# Patient Record
Sex: FEMALE | Race: WHITE | NOT HISPANIC OR LATINO | Employment: FULL TIME | URBAN - METROPOLITAN AREA
[De-identification: names, ages, dates, MRNs, and addresses within clinical notes are randomized per-mention and may not be internally consistent; named-entity substitution may affect disease eponyms.]

---

## 2017-01-13 ENCOUNTER — OFFICE VISIT (OUTPATIENT)
Dept: URGENT CARE | Facility: MEDICAL CENTER | Age: 62
End: 2017-01-13
Payer: COMMERCIAL

## 2017-01-13 ENCOUNTER — TRANSCRIBE ORDERS (OUTPATIENT)
Dept: URGENT CARE | Facility: MEDICAL CENTER | Age: 62
End: 2017-01-13

## 2017-01-13 ENCOUNTER — HOSPITAL ENCOUNTER (OUTPATIENT)
Dept: RADIOLOGY | Facility: MEDICAL CENTER | Age: 62
Discharge: HOME | End: 2017-01-13
Admitting: FAMILY MEDICINE
Payer: COMMERCIAL

## 2017-01-13 DIAGNOSIS — R05.9 COUGH: ICD-10-CM

## 2017-01-13 PROCEDURE — 99203 OFFICE O/P NEW LOW 30 MIN: CPT

## 2017-01-13 PROCEDURE — 71020 HB CHEST X-RAY 2VW FRONTAL&LATL: CPT

## 2017-02-06 ENCOUNTER — ALLSCRIPTS OFFICE VISIT (OUTPATIENT)
Dept: OTHER | Facility: OTHER | Age: 62
End: 2017-02-06

## 2017-03-23 ENCOUNTER — HOSPITAL ENCOUNTER (OUTPATIENT)
Dept: NON INVASIVE DIAGNOSTICS | Facility: CLINIC | Age: 62
Discharge: HOME | End: 2017-03-23
Payer: COMMERCIAL

## 2017-03-23 DIAGNOSIS — I65.23 CAROTID ARTERY STENOSIS, ASYMPTOMATIC, BILATERAL: ICD-10-CM

## 2017-03-23 DIAGNOSIS — I65.29 OCCLUSION AND STENOSIS OF CAROTID ARTERY WITHOUT MENTION OF CEREBRAL INFARCTION: ICD-10-CM

## 2017-03-23 PROCEDURE — 93880 EXTRACRANIAL BILAT STUDY: CPT

## 2017-04-03 DIAGNOSIS — I65.29 OCCLUSION AND STENOSIS OF UNSPECIFIED CAROTID ARTERY: ICD-10-CM

## 2017-04-03 DIAGNOSIS — R12 HEARTBURN: ICD-10-CM

## 2017-04-03 DIAGNOSIS — R00.2 PALPITATIONS: ICD-10-CM

## 2017-04-05 ENCOUNTER — ALLSCRIPTS OFFICE VISIT (OUTPATIENT)
Dept: OTHER | Facility: OTHER | Age: 62
End: 2017-04-05

## 2017-04-17 ENCOUNTER — HOSPITAL ENCOUNTER (OUTPATIENT)
Dept: NON INVASIVE DIAGNOSTICS | Facility: CLINIC | Age: 62
Discharge: HOME | End: 2017-04-17
Payer: COMMERCIAL

## 2017-04-17 DIAGNOSIS — R00.2 PALPITATIONS: ICD-10-CM

## 2017-04-17 DIAGNOSIS — R12 HEARTBURN: ICD-10-CM

## 2017-04-17 PROCEDURE — 93225 XTRNL ECG REC<48 HRS REC: CPT

## 2017-04-17 PROCEDURE — 93226 XTRNL ECG REC<48 HR SCAN A/R: CPT

## 2017-04-20 ENCOUNTER — TRANSCRIBE ORDERS (OUTPATIENT)
Dept: ADMINISTRATIVE | Facility: HOSPITAL | Age: 62
End: 2017-04-20

## 2017-04-20 ENCOUNTER — ALLSCRIPTS OFFICE VISIT (OUTPATIENT)
Dept: OTHER | Facility: OTHER | Age: 62
End: 2017-04-20

## 2017-04-20 DIAGNOSIS — I65.29 STENOSIS OF CAROTID ARTERY, UNSPECIFIED LATERALITY: Primary | ICD-10-CM

## 2017-04-24 ENCOUNTER — APPOINTMENT (OUTPATIENT)
Dept: LAB | Facility: HOSPITAL | Age: 62
End: 2017-04-24
Attending: SURGERY
Payer: COMMERCIAL

## 2017-04-24 DIAGNOSIS — I65.29 OCCLUSION AND STENOSIS OF UNSPECIFIED CAROTID ARTERY: ICD-10-CM

## 2017-04-24 LAB
ANION GAP SERPL CALCULATED.3IONS-SCNC: 10 MMOL/L (ref 4–13)
BUN SERPL-MCNC: 23 MG/DL (ref 5–25)
CALCIUM SERPL-MCNC: 9.1 MG/DL (ref 8.3–10.1)
CHLORIDE SERPL-SCNC: 104 MMOL/L (ref 100–108)
CO2 SERPL-SCNC: 25 MMOL/L (ref 21–32)
CREAT SERPL-MCNC: 0.83 MG/DL (ref 0.6–1.3)
GFR SERPL CREATININE-BSD FRML MDRD: >60 ML/MIN/1.73SQ M
GLUCOSE P FAST SERPL-MCNC: 178 MG/DL (ref 65–99)
POTASSIUM SERPL-SCNC: 4.5 MMOL/L (ref 3.5–5.3)
SODIUM SERPL-SCNC: 139 MMOL/L (ref 136–145)

## 2017-04-24 PROCEDURE — 80048 BASIC METABOLIC PNL TOTAL CA: CPT

## 2017-04-24 PROCEDURE — 36415 COLL VENOUS BLD VENIPUNCTURE: CPT

## 2017-05-01 ENCOUNTER — HOSPITAL ENCOUNTER (OUTPATIENT)
Dept: CT IMAGING | Facility: HOSPITAL | Age: 62
Discharge: HOME | End: 2017-05-01
Attending: SURGERY
Payer: COMMERCIAL

## 2017-05-01 DIAGNOSIS — I65.29 STENOSIS OF CAROTID ARTERY, UNSPECIFIED LATERALITY: ICD-10-CM

## 2017-05-01 PROCEDURE — 70498 CT ANGIOGRAPHY NECK: CPT

## 2017-05-01 RX ADMIN — IOHEXOL 85 ML: 350 INJECTION, SOLUTION INTRAVENOUS at 15:47

## 2017-05-18 ENCOUNTER — ALLSCRIPTS OFFICE VISIT (OUTPATIENT)
Dept: OTHER | Facility: OTHER | Age: 62
End: 2017-05-18

## 2017-08-18 ENCOUNTER — GENERIC CONVERSION - ENCOUNTER (OUTPATIENT)
Dept: OTHER | Facility: OTHER | Age: 62
End: 2017-08-18

## 2017-08-28 ENCOUNTER — ALLSCRIPTS OFFICE VISIT (OUTPATIENT)
Dept: OTHER | Facility: OTHER | Age: 62
End: 2017-08-28

## 2017-09-28 ENCOUNTER — HOSPITAL ENCOUNTER (OUTPATIENT)
Dept: NON INVASIVE DIAGNOSTICS | Facility: CLINIC | Age: 62
Discharge: HOME | End: 2017-09-28
Payer: COMMERCIAL

## 2017-09-28 DIAGNOSIS — I65.29 OCCLUSION AND STENOSIS OF CAROTID ARTERY WITHOUT MENTION OF CEREBRAL INFARCTION: ICD-10-CM

## 2017-09-28 DIAGNOSIS — I65.23 CAROTID ARTERY STENOSIS, ASYMPTOMATIC, BILATERAL: ICD-10-CM

## 2017-09-28 PROCEDURE — 93880 EXTRACRANIAL BILAT STUDY: CPT

## 2017-10-24 NOTE — PROGRESS NOTES
Assessment  1  Sepsis secondary to UTI (038 9,995 91,599 0) (A41 9,N39 0)  2  Type 1 diabetes mellitus without complication (774 85) (H54 5)   · Ophtho in [facility]  Does not have podiatry  3  Insulin long-term use (V58 67) (Z79 4)  4  Hypertension (401 9) (I10)  5  Hyperlipidemia (272 4) (E78 5)  6  3-vessel CAD (414 00) (I25 10)   · Dr Raygoza Hence  7  Asymptomatic carotid artery stenosis (433 10) (I65 29)    Discussion/Summary  Surgical Clearance: She is at a LOW TO MODERATE risk from a cardiovascular standpoint at this time without any additional cardiac testing  Reevaluation needed, if she should present with symptoms prior to surgery/procedure  #1: Sepsis: Patient did have urosepsis secondary to likely stenosis  She has a small stent, and will be seeing urology to have this changed  Based on all of her risk factors, she does have some moderate risk, but overall it should be reasonable for her to proceed with the planned cystoscopy and stent exchange if necessary  Diabetes type 1: Patient is a type I diabetic  Her care for diabetes is done with Dr Anjel Champion  Please consult Dr Anjel Champion if necessary  She does report that her last A1c was 9, however given the urgent need for changing the stent she should proceed with the planned exchange  Long-term use of insulin: Patient is currently using an insulin pump for her type 1 diabetes  Followed by Dr Anjel Champion  Hypertension: Repeated blood pressure was 140/70  No changes at the moment  Follow with cardiology in this office in the future  Hyperlipidemia: Currently on simvastatin at 40 mg  No changes  Follow with office visit in the future  I would recommend every 6 months checking for LFTs  CAD: Stable at the moment  Follows with cardiology  No changes  Carotid stenosis: Patient following with vascular  No changes at the moment  note, the cardiologist had clear the patient and said it was reasonable for her to discontinue Plavix and aspirin one week prior to surgery  will request records from Dr Sumanth Ibarra for recent labs and diabetes testing  Chief Complaint  Preop Physical scheduled with [facility] 9/6  Cystoscopy with possible stent removal and dilation  cleared by Cardio  bw done this past Friday  History of Present Illness  Pre-Op Visit: The patient is being seen for a preoperative visit  The procedure is a(n) Cystoscopy scheduled for 6 September 2017 with Dr Robin Cantu  The indication for surgery is Cloudy urine, and urosepsis  Surgical Risk Assessment:   Prior Anesthesia: She had prior anesthesia,-- no prior adverse reaction to edidural anesthesia-- and-- a prior adverse reaction to general anesthesia-- (Nausea)  Pertinent Past Medical History: no angina, no arrythmia, CAD, CAD with prior MI, CAD without recent PCI, no CHF, no chronic liver disease, no acute hepatitis, no coagulation delay, no primary hypercoagulable state, no secondary hypercoagulable state, no pulmonary embolism, no DVT, uses anticoagulants, diabetes (Patient is true type I diabetic on insulin pump), insulin use, no thyroid disease, no neck osteoarthrosis, no TMJ osteoarthrosis, does not wear dentures, no seizure disorder, no CVA, no asthma, no COPD, not RUTHY, no renal disease, no low serum albumin and no obesity  Exercise Capacity: able to walk four blocks without symptoms-- and-- able to walk two flights of stairs without symptoms  Lifestyle Factors: denies tobacco use, denies heavy alcohol consumption and denies illegal drug use  Symptoms: no symptoms  STOP questionnaire score is 2  Additional RUTHY risk factors include age over 48, but-- normal BMI,-- female gender-- and-- normal neck circumference  Pertinent Family History: no pertinent family history  Living Situation: home is secure and supportive, no post-op concerns with her living situation, living independently with family, private residence and one floor layout  HPI: Patient had been at a urgent care for UTI   She was treated with antibiotics, but did not improve significantly  She ended up with a kidney infection  After that, she was septic she was away from home at the time and wound up in the hospital in [place]  She was admitted for several days, and was DKA had nausea, vomiting  She is seen by Dr Bouchra Tapia for type 1 diabetes on a pump her discharge from the hospital, she has been to urology  They're planning to do a cystoscopy and ureteroscopy, with possible stent exchange  Review of Systems    Constitutional: No fever, no chills, feels well, no tiredness, no recent weight gain or weight loss  Eyes: No complaints of eye pain, no red eyes, no eyesight problems, no discharge, no dry eyes, no itching of eyes  ENT: no complaints of earache, no loss of hearing, no nose bleeds, no nasal discharge, no sore throat, no hoarseness  Cardiovascular: No complaints of slow heart rate, no fast heart rate, no chest pain, no palpitations, no leg claudication, no lower extremity edema  Respiratory: No complaints of shortness of breath, no wheezing, no cough, no SOB on exertion, no orthopnea, no PND  Gastrointestinal: No complaints of abdominal pain, no constipation, no nausea or vomiting, no diarrhea, no bloody stools  Genitourinary: as noted in HPI  Musculoskeletal: No complaints of arthralgias, no myalgias, no joint swelling or stiffness, no limb pain or swelling  Integumentary: No complaints of skin rash or lesions, no itching, no skin wounds, no breast pain or lump  Neurological: No complaints of headache, no confusion, no convulsions, no numbness, no dizziness or fainting, no tingling, no limb weakness, no difficulty walking  Psychiatric: Not suicidal, no sleep disturbance, no anxiety or depression, no change in personality, no emotional problems  Endocrine: No complaints of proptosis, no hot flashes, no muscle weakness, no deepening of the voice, no feelings of weakness     Hematologic/Lymphatic: No complaints of swollen glands, no swollen glands in the neck, does not bleed easily, does not bruise easily  Active Problems  1  3-vessel CAD (414 00) (I25 10)  2  History of Acute Myocardial Infarction (V12 59)  3  Asymptomatic carotid artery stenosis (433 10) (I65 29)  4  Colon cancer screening (V76 51) (Z12 11)  5  Early menopause (256 31) (E28 319)  6  Edema (782 3) (R60 9)  7  Encounter for screening for osteoporosis (V82 81) (Z13 820)  8  Encounter for screening mammogram for breast cancer (V76 12) (Z12 31)  9  Heart burn (787 1) (R12)  10  Hyperlipidemia (272 4) (E78 5)  11  Hypertension (401 9) (I10)  12  Insulin long-term use (V58 67) (Z79 4)  13  Lung nodule seen on imaging study (793 11) (R91 1)  14  Old myocardial infarction (412) (I25 2)  15  Palpitations (785 1) (R00 2)  16  Right wrist pain (719 43) (M25 531)  17  Stenosis of right carotid artery (433 10) (I65 21)  18  Tendonitis of wrist, right (727 05) (M77 8)  19  Type 1 diabetes mellitus without complication (154 26) (B14 6)  20  Visit for routine gyn exam ( 31) (Z01 419)    Past Medical History   · Acute bronchitis (466 0) (J20 9)   · History of Acute Myocardial Infarction (V12 59)   · History of ectopic pregnancy (V13 29) (Z87 59)    The active problems and past medical history were reviewed and updated today  Surgical History   · History of Cath Stent Placement   · History of  Section   · History of Closed Treatment Of Fracture Of Metatarsal Bone(S) (Each)   · History of Tubal Ligation    The surgical history was reviewed and updated today  Family History  Father    · Family history of Myocardial Infarction Arrhythmias   · Family history of Prostate Cancer (V16 42)  Family History    · Family history of Coronary Artery Triple Arterial Bypass Graft    The family history was reviewed and updated today         Social History   · Being A Social Drinker   · Denied: History of Drug Use   · Former smoker (S48 97) (X04 088)  The social history was reviewed and updated today  The social history was reviewed and is unchanged  Current Meds  1  Aspirin 81 MG TABS; TAKE 1 TABLET DAILY; Therapy: (Recorded:52Cql6358) to Recorded  2  Clopidogrel Bisulfate 75 MG Oral Tablet; Take 1 tablet daily; Therapy: 73LQO3977 to (Last Rx:42Cmv0312)  Requested for: 07Moj1271 Ordered  3  FreeStyle Lite Test In [employer]; Check 4 times daily; Therapy: 00Kkm6686 to (Evaluate:32Vor9254)  Requested for: 85JIA2397; Last   Rx:68Xbv1473; Status: ACTIVE - Renewal Denied Ordered  4  HumaLOG 100 UNIT/ML Subcutaneous Solution; INJECT 180 UNITS EVERY 3 DAYS   VIA PUMP; Therapy: 89YRW1206 to (Evaluate:48Sfh5782)  Requested for: 65Tnd9053; Last   Rx:59Hui1700 Ordered  5  Lisinopril 10 MG Oral Tablet; Take 1 tablet daily; Therapy: 17AAG2130 to (Holly Flower)  Requested for: 23NHD9688; Last   EO:51RQK3212 Ordered  6  Metoprolol Tartrate 25 MG Oral Tablet; take 1 tablet twice a day; Therapy: 11BLJ7482 to (Last SC:45GVN5303)  Requested for: 26Jun2017 Ordered  7  Niaspan 500 MG Oral Tablet Extended Release; Take 1 tablet daily; Therapy: 27CVS3927 to  Requested for: 89VPJ0790 Recorded  8  Nitroglycerin 400 MCG/SPRAY Translingual Aerosol Solution; PRN CHEST PAIN;   Therapy: 22XFX6281 to (Last Rx:47Mvi7739)  Requested for: 23Kxk5049 Ordered  9  Simvastatin 40 MG Oral Tablet; Take 1 tablet daily; Therapy: 81DYP9265 to (Last Rx:63Owt1042)  Requested for: 00OYH6781 Ordered    The medication list was reviewed and updated today  Allergies  1  Morphine Derivatives    Vitals   Recorded: 28Aug2017 04:39PM Recorded: 28Aug2017 03:56PM   Temperature  97 4 F   Heart Rate  76   Systolic 438 550   Diastolic 70 64   Height  5 ft 2 in   Weight  176 lb 8 oz   BMI Calculated  32 28   BSA Calculated  1 81     Physical Exam    Constitutional   General appearance: No acute distress, well appearing and well nourished  Eyes   Conjunctiva and lids: No swelling, erythema or discharge      Pupils and irises: Equal, round, reactive to light  Ears, Nose, Mouth, and Throat   External inspection of ears and nose: Normal     Otoscopic examination: Tympanic membranes translucent with normal light reflex  Canals patent without erythema  Hearing: Normal     Nasal mucosa, septum, and turbinates: Normal without edema or erythema  Lips, teeth, and gums: Normal, good dentition  Oropharynx: Normal with no erythema, edema, exudate or lesions  Neck   Neck: Supple, symmetric, trachea midline, no masses  Thyroid: Normal, no thyromegaly  Pulmonary   Respiratory effort: No increased work of breathing or signs of respiratory distress  Auscultation of lungs: Clear to auscultation  Cardiovascular   Auscultation of heart: Normal rate and rhythm, normal S1 and S2, no murmurs  Carotid pulses: 2+ bilaterally  Examination of extremities for edema and/or varicosities: Normal     Abdomen   Abdomen: Non-tender, no masses  Lymphatic   Palpation of lymph nodes in neck: No lymphadenopathy  Musculoskeletal   Gait and station: Normal     Digits and nails: Normal without clubbing or cyanosis  Joints, bones, and muscles: Normal     Skin   Skin and subcutaneous tissue: Normal without rashes or lesions  Neurologic   Cranial nerves: Cranial nerves II-XII intact  Reflexes: 2+ and symmetric  Sensation: No sensory loss  Psychiatric   Judgment and insight: Normal     Orientation to person, place, and time: Normal     Recent and remote memory: Intact  Mood and affect: Normal        End of Encounter Meds  1  Metoprolol Tartrate 25 MG Oral Tablet; take 1 tablet twice a day; Therapy: 78POL3437 to (Last AP:31BJH8367)  Requested for: 26Jun2017 Ordered  2  Clopidogrel Bisulfate 75 MG Oral Tablet; Take 1 tablet daily; Therapy: 08EWV1288 to (Last Rx:07Nyj0436)  Requested for: 18Aug2017 Ordered  3  Niaspan 500 MG Oral Tablet Extended Release (Niacin ER (Antihyperlipidemic)); Take 1   tablet daily;    Therapy: 94SKT6543 to  Requested for: 45VHA2665 Recorded  4  Simvastatin 40 MG Oral Tablet; Take 1 tablet daily; Therapy: 25VFP0997 to (Last Rx:38Bye0609)  Requested for: 47JZK2390 Ordered  5  Lisinopril 10 MG Oral Tablet; Take 1 tablet daily; Therapy: 38IUY3269 to (Santos Belfast)  Requested for: 15RKD5252; Last   HZ:93ZJX8575 Ordered  6  Nitroglycerin 400 MCG/SPRAY Translingual Aerosol Solution; PRN CHEST PAIN;   Therapy: 74LQV7968 to (Last Rx:51Wsi7434)  Requested for: 88Srp1652 Ordered  7  FreeStyle Lite Test In [employer]; Check 4 times daily; Therapy: 00Tnb7991 to (Evaluate:14Iig6143)  Requested for: 82LUB8472; Last   Rx:21Aao0360; Status: ACTIVE - Renewal Denied Ordered  8  HumaLOG 100 UNIT/ML Subcutaneous Solution; INJECT 180 UNITS EVERY 3 DAYS   VIA PUMP; Therapy: 53SNS9633 to (Evaluate:50Ezo5531)  Requested for: 01Qbf9536; Last   Rx:10Gfd2235 Ordered  9  Aspirin 81 MG TABS; TAKE 1 TABLET DAILY; Therapy: (Recorded:84Hhw0881) to Recorded    Signatures   Electronically signed by :  NATTY Quiñones ; Aug 31 2017  1:27PM EST

## 2018-01-11 NOTE — PROGRESS NOTES
Chief Complaint  Patient presented to cardiology for 48 hour HM and EKG  Patient complains of B/L leg edema  Blood pressure was mildly elevated  Patient's EKG was normal, see scanned image  Patient will await results of Holter monitor testing and any further instructions  Active Problems    1  3-vessel CAD (414 00) (I25 10)   2  History of Acute Myocardial Infarction (V12 59)   3  Asymptomatic carotid artery stenosis (433 10) (I65 29)   4  Colon cancer screening (V76 51) (Z12 11)   5  Early menopause (256 31) (E28 319)   6  Encounter for screening for osteoporosis (V82 81) (Z13 820)   7  Encounter for screening mammogram for breast cancer (V76 12) (Z12 31)   8  Heart burn (787 1) (R12)   9  Hyperlipidemia (272 4) (E78 5)   10  Hypertension (401 9) (I10)   11  Insulin long-term use (V58 67) (Z79 4)   12  Lung nodule seen on imaging study (793 11) (R91 1)   13  Old myocardial infarction (412) (I25 2)   14  Palpitations (785 1) (R00 2)   15  Right wrist pain (719 43) (M25 531)   16  Stenosis of right carotid artery (433 10) (I65 21)   17  Tendonitis of wrist, right (727 05) (M77 8)   18  Type 1 diabetes mellitus without complication (096 35) (M59 9)   19  Visit for routine gyn exam (V72 31) (Z01 419)   20  Visit for routine gyn exam (V72 31) (Z01 419)    Current Meds   1  Aspirin 81 MG TABS; TAKE 1 TABLET DAILY; Therapy: (Recorded:05Wwx5850) to Recorded   2  Clopidogrel Bisulfate 75 MG Oral Tablet; Take 1 tablet daily; Therapy: 06GPM2526 to (Last Rx:53Shk2560)  Requested for: 68WRA8112 Ordered   3  FreeStyle Lite Test In [employer]; Check 4 times daily; Therapy: 57Lzu2267 to (Evaluate:75Dso2510)  Requested for: 84MNT8594; Last   Rx:38Utu0471; Status: ACTIVE - Renewal Denied Ordered   4  HumaLOG 100 UNIT/ML Subcutaneous Solution; INJECT 180 UNITS EVERY 3 DAYS   VIA PUMP; Therapy: 66BNT9137 to (Evaluate:25Sis4004)  Requested for: 44Opm0828; Last   Rx:98Kbj6720 Ordered   5   Lisinopril 10 MG Oral Tablet; Take 1 tablet daily; Therapy: 40SZM8634 to (Irlanda Conde)  Requested for: 06UIB4226; Last   EW:59TDB8022 Ordered   6  Metoprolol Tartrate 25 MG Oral Tablet; take 1 tablet twice a day; Therapy: 53FVS6946 to (Last Rx:48Jbg0689)  Requested for: 48Jsb0949 Ordered   7  Niaspan 500 MG Oral Tablet Extended Release; Take 1 tablet daily; Therapy: 79YVR3901 to  Requested for: 76RNB1989 Recorded   8  Nitroglycerin 400 MCG/SPRAY Translingual Aerosol Solution; PRN CHEST PAIN;   Therapy: 35JNV0721 to (Last Rx:28Mrd7307)  Requested for: 64Tbh3227 Ordered   9  Simvastatin 40 MG Oral Tablet; TAKE 1 TABLET DAILY  Requested for: 03ONI1641; Last   Rx:08Jun2016 Ordered    Allergies    1  Morphine Derivatives    Vitals  Signs    Heart Rate: 63, Apical  Pulse Quality: Normal, Apical  Systolic: 897, LUE, Sitting  Diastolic: 60, LUE, Sitting  Height: 5 ft 2 in  Weight: 189 lb   BMI Calculated: 34 57  BSA Calculated: 1 87    Assessment    1   Edema (782 3) (R60 9)    Plan  Edema, Palpitations    · EKG/ECG- POC; Status:Complete;   Done: 31WOL1042 04:26PM    Future Appointments    Date/Time Provider Specialty Site   04/20/2017 02:00 PM Greta Garber MD Vascular Surgery [facility]     Signatures   Electronically signed by : NATTY Fontenot ; Apr 5 2017  4:30PM EST                       (Author)

## 2018-01-11 NOTE — CONSULTS
I had the pleasure of evaluating your patient, Sena Shi  My full evaluation follows:      Chief Complaint  Preop Physical scheduled with [facility] 9/6  Cystoscopy with possible stent removal and dilation  Already cleared by Cardio  Had bw done this past Friday  mjs       History of Present Illness  Pre-Op Visit: The patient is being seen for a preoperative visit  The procedure is a(n) Cystoscopy scheduled for 6 September 2017 with Dr Robin Cantu  The indication for surgery is Cloudy urine, and urosepsis  Surgical Risk Assessment:   Prior Anesthesia: She had prior anesthesia, no prior adverse reaction to edidural anesthesia and a prior adverse reaction to general anesthesia (Nausea)  Pertinent Past Medical History: no angina, no arrythmia, CAD, CAD with prior MI, CAD without recent PCI, no CHF, no chronic liver disease, no acute hepatitis, no coagulation delay, no primary hypercoagulable state, no secondary hypercoagulable state, no pulmonary embolism, no DVT, uses anticoagulants, diabetes (Patient is true type I diabetic on insulin pump), insulin use, no thyroid disease, no neck osteoarthrosis, no TMJ osteoarthrosis, does not wear dentures, no seizure disorder, no CVA, no asthma, no COPD, not RUTHY, no renal disease, no low serum albumin and no obesity  Exercise Capacity: able to walk four blocks without symptoms and able to walk two flights of stairs without symptoms  Lifestyle Factors: denies tobacco use, denies heavy alcohol consumption and denies illegal drug use  Symptoms: no symptoms  STOP questionnaire score is 2  Additional RUTHY risk factors include age over 48, but normal BMI, female gender and normal neck circumference  Pertinent Family History: no pertinent family history  Living Situation: home is secure and supportive, no post-op concerns with her living situation, living independently with family, private residence and one floor layout  HPI: Patient had been at a urgent care for UTI  She was treated with antibiotics, but did not improve significantly  She ended up with a kidney infection  After that, she was septic  Unfortunately, she was away from home at the time and wound up in the hospital in [place]  She was admitted for several days, and was "DKA "  She had nausea, vomiting  She is seen by Dr Qi Kemp for type 1 diabetes on a pump  Since her discharge from the hospital, she has been to urology  They're planning to do a cystoscopy and ureteroscopy, with possible stent exchange  Review of Systems    Constitutional: No fever, no chills, feels well, no tiredness, no recent weight gain or weight loss  Eyes: No complaints of eye pain, no red eyes, no eyesight problems, no discharge, no dry eyes, no itching of eyes  ENT: no complaints of earache, no loss of hearing, no nose bleeds, no nasal discharge, no sore throat, no hoarseness  Cardiovascular: No complaints of slow heart rate, no fast heart rate, no chest pain, no palpitations, no leg claudication, no lower extremity edema  Respiratory: No complaints of shortness of breath, no wheezing, no cough, no SOB on exertion, no orthopnea, no PND  Gastrointestinal: No complaints of abdominal pain, no constipation, no nausea or vomiting, no diarrhea, no bloody stools  Genitourinary: as noted in HPI  Musculoskeletal: No complaints of arthralgias, no myalgias, no joint swelling or stiffness, no limb pain or swelling  Integumentary: No complaints of skin rash or lesions, no itching, no skin wounds, no breast pain or lump  Neurological: No complaints of headache, no confusion, no convulsions, no numbness, no dizziness or fainting, no tingling, no limb weakness, no difficulty walking  Psychiatric: Not suicidal, no sleep disturbance, no anxiety or depression, no change in personality, no emotional problems  Endocrine: No complaints of proptosis, no hot flashes, no muscle weakness, no deepening of the voice, no feelings of weakness  Hematologic/Lymphatic: No complaints of swollen glands, no swollen glands in the neck, does not bleed easily, does not bruise easily  Active Problems    1  3-vessel CAD (414 00) (I25 10)   2  History of Acute Myocardial Infarction (V12 59)   3  Asymptomatic carotid artery stenosis (433 10) (I65 29)   4  Colon cancer screening (V76 51) (Z12 11)   5  Early menopause (256 31) (E28 319)   6  Edema (782 3) (R60 9)   7  Encounter for screening for osteoporosis (V82 81) (Z13 820)   8  Encounter for screening mammogram for breast cancer (V76 12) (Z12 31)   9  Heart burn (787 1) (R12)   10  Hyperlipidemia (272 4) (E78 5)   11  Hypertension (401 9) (I10)   12  Insulin long-term use (V58 67) (Z79 4)   13  Lung nodule seen on imaging study (793 11) (R91 1)   14  Old myocardial infarction (412) (I25 2)   15  Palpitations (785 1) (R00 2)   16  Right wrist pain (719 43) (M25 531)   17  Stenosis of right carotid artery (433 10) (I65 21)   18  Tendonitis of wrist, right (727 05) (M77 8)   19  Type 1 diabetes mellitus without complication (920 76) (Y62 4)   20  Visit for routine gyn exam ( 31) (Z01 419)    Past Medical History    · Acute bronchitis (466 0) (J20 9)   · History of Acute Myocardial Infarction (V12 59)   · History of ectopic pregnancy (V13 29) (Z87 59)    The active problems and past medical history were reviewed and updated today  Surgical History    · History of Cath Stent Placement   · History of  Section   · History of Closed Treatment Of Fracture Of Metatarsal Bone(S) (Each)   · History of Tubal Ligation    The surgical history was reviewed and updated today  Family History    · Family history of Myocardial Infarction Arrhythmias   · Family history of Prostate Cancer (A03 18)    · Family history of Coronary Artery Triple Arterial Bypass Graft    The family history was reviewed and updated today         Social History    · Being A Social Drinker   · Denied: History of Drug Use   · Former smoker (V15 82) (Z58 624)  The social history was reviewed and updated today  The social history was reviewed and is unchanged  Current Meds   1  Aspirin 81 MG TABS; TAKE 1 TABLET DAILY; Therapy: (Recorded:53Hdb3249) to Recorded   2  Clopidogrel Bisulfate 75 MG Oral Tablet; Take 1 tablet daily; Therapy: 02CUD2453 to (Last Rx:12Ttn0526)  Requested for: 55Rjo5456 Ordered   3  FreeStyle Lite Test In [employer]; Check 4 times daily; Therapy: 03Yev7220 to (Evaluate:04Qfz7339)  Requested for: 59DCE2274; Last   Rx:67Khf3279; Status: ACTIVE - Renewal Denied Ordered   4  HumaLOG 100 UNIT/ML Subcutaneous Solution; INJECT 180 UNITS EVERY 3 DAYS VIA   PUMP; Therapy: 78UHW5511 to (Evaluate:62Fmi2257)  Requested for: 36Qhl0387; Last   Rx:69Uqz6073 Ordered   5  Lisinopril 10 MG Oral Tablet; Take 1 tablet daily; Therapy: 20CWS5407 to ([phone])  Requested for: 43EMJ3161; Last   MK:39KVO8926 Ordered   6  Metoprolol Tartrate 25 MG Oral Tablet; take 1 tablet twice a day; Therapy: 13XZG9065 to (Last TU:16DKS6643)  Requested for: 26Jun2017 Ordered   7  Niaspan 500 MG Oral Tablet Extended Release; Take 1 tablet daily; Therapy: 79ZQL3222 to  Requested for: 08OSQ7292 Recorded   8  Nitroglycerin 400 MCG/SPRAY Translingual Aerosol Solution; PRN CHEST PAIN;   Therapy: 99LOP1612 to (Last Rx:27Iut4153)  Requested for: 51Lpp5978 Ordered   9  Simvastatin 40 MG Oral Tablet; Take 1 tablet daily; Therapy: 87GIG7439 to (Last Rx:37Vci4550)  Requested for: 57BMU7985 Ordered    The medication list was reviewed and updated today  Allergies    1  Morphine Derivatives    Vitals  Signs    Systolic: 888  Diastolic: 70   Temperature: 97 4 F  Heart Rate: 76  Systolic: 666  Diastolic: 64  Height: 5 ft 2 in  Weight: 176 lb 8 oz  BMI Calculated: 32 28  BSA Calculated: 1 81    Physical Exam    Constitutional   General appearance: No acute distress, well appearing and well nourished      Eyes   Conjunctiva and lids: No swelling, erythema or discharge  Pupils and irises: Equal, round, reactive to light  Ears, Nose, Mouth, and Throat   External inspection of ears and nose: Normal     Otoscopic examination: Tympanic membranes translucent with normal light reflex  Canals patent without erythema  Hearing: Normal     Nasal mucosa, septum, and turbinates: Normal without edema or erythema  Lips, teeth, and gums: Normal, good dentition  Oropharynx: Normal with no erythema, edema, exudate or lesions  Neck   Neck: Supple, symmetric, trachea midline, no masses  Thyroid: Normal, no thyromegaly  Pulmonary   Respiratory effort: No increased work of breathing or signs of respiratory distress  Auscultation of lungs: Clear to auscultation  Cardiovascular   Auscultation of heart: Normal rate and rhythm, normal S1 and S2, no murmurs  Carotid pulses: 2+ bilaterally  Examination of extremities for edema and/or varicosities: Normal     Abdomen   Abdomen: Non-tender, no masses  Lymphatic   Palpation of lymph nodes in neck: No lymphadenopathy  Musculoskeletal   Gait and station: Normal     Digits and nails: Normal without clubbing or cyanosis  Joints, bones, and muscles: Normal     Skin   Skin and subcutaneous tissue: Normal without rashes or lesions  Neurologic   Cranial nerves: Cranial nerves II-XII intact  Reflexes: 2+ and symmetric  Sensation: No sensory loss  Psychiatric   Judgment and insight: Normal     Orientation to person, place, and time: Normal     Recent and remote memory: Intact  Mood and affect: Normal        Assessment    1  Sepsis secondary to UTI (038 9,995 91,599 0) (A41 9,N39 0)   2  Type 1 diabetes mellitus without complication (436 23) (T35 3)   · [facility]  Does not have podiatry   3  Insulin long-term use (V58 67) (Z79 4)   4  Hypertension (401 9) (I10)   5  Hyperlipidemia (272 4) (E78 5)   6  3-vessel CAD (414 00) (I25 10)   · Dr Tavo Orozco   7   Asymptomatic carotid artery stenosis (433 10) (I65 29)    Discussion/Summary  Surgical Clearance: She is at a LOW TO MODERATE risk from a cardiovascular standpoint at this time without any additional cardiac testing  Reevaluation needed, if she should present with symptoms prior to surgery/procedure  #1: Sepsis: Patient did have urosepsis secondary to likely stenosis  She has a small stent, and will be seeing urology to have this changed  Based on all of her risk factors, she does have some moderate risk, but overall it should be reasonable for her to proceed with the planned cystoscopy and stent exchange if necessary  #2: Diabetes type 1: Patient is a type I diabetic  Her care for diabetes is done with Dr Tricia Patel  Please consult Dr Tricia Patel if necessary  She does report that her last A1c was 9, however given the urgent need for changing the stent she should proceed with the planned exchange  #3: Long-term use of insulin: Patient is currently using an insulin pump for her type 1 diabetes  Followed by Dr Tricia Patel  #4: Hypertension: Repeated blood pressure was 140/70  No changes at the moment  Follow with cardiology in this office in the future  #5: Hyperlipidemia: Currently on simvastatin at 40 mg  No changes  Follow with office visit in the future  I would recommend every 6 months checking for LFTs  #6: CAD: Stable at the moment  Follows with cardiology  No changes  #7: Carotid stenosis: Patient following with vascular  No changes at the moment  Of note, the cardiologist had clear the patient and said it was reasonable for her to discontinue Plavix and aspirin one week prior to surgery  We will request records from Dr Tricia Patel for recent labs and diabetes testing  Thank you very much for allowing me to participate in the care of this patient  If you have any questions, please do not hesitate to contact me  End of Encounter Meds    1  Metoprolol Tartrate 25 MG Oral Tablet; take 1 tablet twice a day;    Therapy: 67PZC9921 to (Last QC:99DHD9445)  Requested for: 26Jun2017 Ordered    2  Clopidogrel Bisulfate 75 MG Oral Tablet; Take 1 tablet daily; Therapy: 46PVV9768 to (Last Rx:48Kac8253)  Requested for: 55Avn6896 Ordered    3  Niaspan 500 MG Oral Tablet Extended Release (Niacin ER (Antihyperlipidemic)); Take 1   tablet daily; Therapy: 79YYU4131 to  Requested for: 19KOH9544 Recorded   4  Simvastatin 40 MG Oral Tablet; Take 1 tablet daily; Therapy: 17CJS7239 to (Last Rx:81Dwe3582)  Requested for: 04ICH9501 Ordered    5  Lisinopril 10 MG Oral Tablet; Take 1 tablet daily; Therapy: 50HHC6077 to (Sridevi Vega)  Requested for: 98XVR2651; Last   HI:84WIP5733 Ordered   6  Nitroglycerin 400 MCG/SPRAY Translingual Aerosol Solution; PRN CHEST PAIN;   Therapy: 44YGL3635 to (Last Rx:83Aho8000)  Requested for: 63Uri5653 Ordered    7  FreeStyle Lite Test In [employer]; Check 4 times daily; Therapy: 61Vvz8605 to (Evaluate:99Ior5760)  Requested for: 08KDN9002; Last   Rx:32Zmb7634; Status: ACTIVE - Renewal Denied Ordered   8  HumaLOG 100 UNIT/ML Subcutaneous Solution; INJECT 180 UNITS EVERY 3 DAYS VIA   PUMP; Therapy: 48WTI2835 to (Evaluate:07Snb7017)  Requested for: 98Ikz7379; Last   Rx:95Jvd5287 Ordered    9  Aspirin 81 MG TABS; TAKE 1 TABLET DAILY; Therapy: (Recorded:48Jrf4503) to Recorded    Signatures   Electronically signed by :  NATTY Brown ; Aug 28 2017  4:48PM EST                       (Author)

## 2018-01-11 NOTE — RESULT NOTES
Verified Results  * MAMMO SCREENING BILATERAL W CAD 36USZ1661 04:52PM Lee Salinas     Test Name Result Flag Reference   MAMMO SCREENING BILATERAL W CAD (Report)     Patient History:   Patient is postmenopausal    Family history of breast cancer in paternal aunt at age 76  Patient is a former smoker  Patient's BMI is 30 0  Reason for exam: screening (asymptomatic)  Mammo Screening Bilateral W CAD: March 8, 2016 - Check In #:    [de-identified]   Bilateral CC and MLO view(s) were taken  Technologist: GUICHO Woody (R)(M)   Prior study comparison: January 26, 2012, bilateral digital    screening mammogram performed at [place] [address]  December 16, 2010, bilateral digital screening    mammogram performed at [place] [address]  December 10, 2009, bilateral digital screening mammogram   performed at [place] [address]  There are scattered fibroglandular densities  The parenchymal pattern appears stable  No dominant soft tissue    mass or suspicious calcifications are noted  The skin and nipple   contours are within normal limits  No mammographic evidence of malignancy  No    significant changes when compared with prior studies  ASSESSMENT: BiRad:1 - Negative     Recommendation:   Routine screening mammogram in 1 year  A reminder letter will be   scheduled  Analyzed by CAD     8-10% of cancers will be missed on mammography  Management of a    palpable abnormality must be based on clinical grounds  Patients   will be notified of their results via letter from our facility  Accredited by [employer] of Rad[employer] and FDA       Transcription Location: GUICHO [address]: [id number]     Risk Value(s):   Tyrer-Cuzick 10 Year: 4 534%, Tyrer-Cuzick Lifetime: 12 182%,    Myriad Table: 1 5%, VINICIUS 5 Year: 1 1%, NCI Lifetime: 6 3%   Signed by:   Fatimah Suero MD   3/8/16

## 2018-01-12 VITALS
HEART RATE: 72 BPM | HEIGHT: 62 IN | RESPIRATION RATE: 14 BRPM | BODY MASS INDEX: 35.03 KG/M2 | DIASTOLIC BLOOD PRESSURE: 72 MMHG | SYSTOLIC BLOOD PRESSURE: 162 MMHG | WEIGHT: 190.38 LBS

## 2018-01-13 VITALS
DIASTOLIC BLOOD PRESSURE: 70 MMHG | WEIGHT: 176.5 LBS | HEART RATE: 76 BPM | TEMPERATURE: 97.4 F | HEIGHT: 62 IN | BODY MASS INDEX: 32.48 KG/M2 | SYSTOLIC BLOOD PRESSURE: 140 MMHG

## 2018-01-13 VITALS
HEART RATE: 63 BPM | SYSTOLIC BLOOD PRESSURE: 160 MMHG | WEIGHT: 189 LBS | HEIGHT: 62 IN | DIASTOLIC BLOOD PRESSURE: 60 MMHG | BODY MASS INDEX: 34.78 KG/M2

## 2018-01-14 VITALS
RESPIRATION RATE: 16 BRPM | BODY MASS INDEX: 34.85 KG/M2 | HEIGHT: 62 IN | WEIGHT: 189.38 LBS | SYSTOLIC BLOOD PRESSURE: 130 MMHG | DIASTOLIC BLOOD PRESSURE: 60 MMHG | HEART RATE: 72 BPM

## 2018-01-14 VITALS
SYSTOLIC BLOOD PRESSURE: 120 MMHG | DIASTOLIC BLOOD PRESSURE: 60 MMHG | HEIGHT: 62 IN | WEIGHT: 185.56 LBS | BODY MASS INDEX: 34.15 KG/M2 | HEART RATE: 67 BPM

## 2018-02-01 ENCOUNTER — OFFICE VISIT (OUTPATIENT)
Dept: CARDIOLOGY CLINIC | Facility: CLINIC | Age: 63
End: 2018-02-01
Payer: COMMERCIAL

## 2018-02-01 VITALS
DIASTOLIC BLOOD PRESSURE: 70 MMHG | BODY MASS INDEX: 30.39 KG/M2 | SYSTOLIC BLOOD PRESSURE: 140 MMHG | HEIGHT: 64 IN | WEIGHT: 178 LBS | HEART RATE: 60 BPM

## 2018-02-01 DIAGNOSIS — E78.00 HYPERCHOLESTEROLEMIA: ICD-10-CM

## 2018-02-01 DIAGNOSIS — I25.10 CORONARY ARTERY DISEASE INVOLVING NATIVE CORONARY ARTERY OF NATIVE HEART WITHOUT ANGINA PECTORIS: Primary | ICD-10-CM

## 2018-02-01 PROCEDURE — 93000 ELECTROCARDIOGRAM COMPLETE: CPT | Performed by: INTERNAL MEDICINE

## 2018-02-01 PROCEDURE — 99214 OFFICE O/P EST MOD 30 MIN: CPT | Performed by: INTERNAL MEDICINE

## 2018-02-01 RX ORDER — CLOPIDOGREL BISULFATE 75 MG/1
1 TABLET ORAL DAILY
COMMUNITY
Start: 2014-03-27 | End: 2018-02-14 | Stop reason: SDUPTHER

## 2018-02-01 RX ORDER — LISINOPRIL 10 MG/1
1 TABLET ORAL DAILY
COMMUNITY
Start: 2011-12-15 | End: 2018-05-14 | Stop reason: SDUPTHER

## 2018-02-01 RX ORDER — NITROGLYCERIN 400 UG/1
SPRAY ORAL
COMMUNITY
Start: 2016-02-18

## 2018-02-01 RX ORDER — SIMVASTATIN 40 MG
1 TABLET ORAL DAILY
COMMUNITY
Start: 2017-05-16 | End: 2018-02-10 | Stop reason: SDUPTHER

## 2018-02-01 RX ORDER — NIACIN 500 MG/1
1 TABLET, EXTENDED RELEASE ORAL DAILY
COMMUNITY
Start: 2018-01-08 | End: 2018-12-09 | Stop reason: SDUPTHER

## 2018-02-01 NOTE — PROGRESS NOTES
Cardiology Follow Up    Lynda Arreaga  1955  [id number]  [address] [facility]  [address] [place] [address] [address]    1  Coronary artery disease involving native coronary artery of native heart without angina pectoris  POCT ECG    Lipid Panel with Direct LDL reflex    CT chest without contrast   2  Hypercholesterolemia  CT chest without contrast       Interval History:  Cardiology follow-up  Patient continues to do well  She is active but not exercising regular, denies any chest pain or dyspnea, her diabetes under fair control per patient, she is compliant with low-cholesterol diet  I do not have recent fractionation  Also compliant with low-sodium diet, blood pressures been well controlled  She was admitted to the hospital with urosepsis in the setting of UPG stenosis  Possibly extrinsic compression  Patient Active Problem List   Diagnosis    Coronary artery disease involving native coronary artery of native heart without angina pectoris    Hypercholesterolemia     Past Medical History:   Diagnosis Date    Carotid artery occlusion     Coronary artery disease     Diabetes mellitus ([facility] [address] )     Hyperlipidemia     Hypertension      Social History     Social History    Marital status: /Civil Union     Spouse name: N/A    Number of children: N/A    Years of education: N/A     Occupational History    Not on file  Social History Main Topics    Smoking status: Current Every Day Smoker    Smokeless tobacco: Current User    Alcohol use Not on file    Drug use: Unknown    Sexual activity: Not on file     Other Topics Concern    Not on file     Social History Narrative    No narrative on file      No family history on file    Past Surgical History:   Procedure Laterality Date    CORONARY ANGIOPLASTY         Current Outpatient Prescriptions:     aspirin 81 MG tablet, Take 1 tablet by mouth daily, Disp: , Rfl:     clopidogrel (PLAVIX) 75 mg tablet, Take 1 tablet by mouth daily, Disp: , Rfl:     insulin lispro (HUMALOG) 100 units/mL injection, Inject under the skin, Disp: , Rfl:     lisinopril (ZESTRIL) 10 mg tablet, Take 1 tablet by mouth daily, Disp: , Rfl:     metoprolol tartrate (LOPRESSOR) 25 mg tablet, Take 1 tablet by mouth 2 (two) times a day, Disp: , Rfl:     niacin (NIASPAN) 500 mg CR tablet, Take 1 tablet by mouth daily, Disp: , Rfl:     nitroglycerin (NITROLINGUAL) 0 4 mg/spray spray, Nitroglycerin 400 MCG/SPRAY Translingual Aerosol Solution; PRN CHEST PAIN; 90; 3; 18-Feb-2016; 18-Feb-2016; Aungarrett Gurrola; Active, Disp: , Rfl:     simvastatin (ZOCOR) 40 mg tablet, Take 1 tablet by mouth daily, Disp: , Rfl:   No Known Allergies    Labs:  No visits with results within 6 Month(s) from this visit  Latest known visit with results is:   Appointment on 04/24/2017   Component Date Value    Sodium 04/24/2017 139     Potassium 04/24/2017 4 5     Chloride 04/24/2017 104     CO2 04/24/2017 25     Anion Gap 04/24/2017 10     BUN 04/24/2017 23     Creatinine 04/24/2017 0 83     Glucose, Fasting 04/24/2017 178*    Calcium 04/24/2017 9 1     eGFR 04/24/2017 >60 0      Imaging: No results found  Review of Systems:  Review of Systems   Constitutional: Negative for activity change, appetite change, diaphoresis, fatigue and fever  HENT: Negative for hearing loss and nosebleeds  Eyes: Negative for visual disturbance  Respiratory: Negative for apnea, cough, choking, chest tightness, shortness of breath, wheezing and stridor  Cardiovascular: Negative for chest pain, palpitations and leg swelling  Gastrointestinal: Negative for abdominal pain and blood in stool  Genitourinary: Negative for dysuria and hematuria  Musculoskeletal: Negative for arthralgias, back pain, joint swelling and myalgias  Skin: Negative for color change, pallor and rash     Allergic/Immunologic: Negative for immunocompromised state  Neurological: Negative for dizziness, tremors, seizures, syncope, weakness and numbness  Hematological: Does not bruise/bleed easily  Psychiatric/Behavioral: Negative for confusion  Physical Exam:  Physical Exam   Constitutional: She is oriented to person, place, and time  She appears well-developed and well-nourished  No distress  HENT:   Head: Normocephalic  Eyes: No scleral icterus  Neck: No JVD present  No tracheal deviation present  No thyromegaly present  Cardiovascular: Normal rate, regular rhythm, normal heart sounds and intact distal pulses  No extrasystoles are present  Exam reveals no gallop, no distant heart sounds and no friction rub  No murmur heard  Pulses:       Carotid pulses are 2+ on the right side with bruit, and 2+ on the left side with bruit  Radial pulses are 2+ on the right side, and 2+ on the left side  Dorsalis pedis pulses are 1+ on the right side, and 1+ on the left side  Posterior tibial pulses are 1+ on the right side, and 1+ on the left side  Pulmonary/Chest: Effort normal and breath sounds normal  No respiratory distress  She has no wheezes  She has no rales  She exhibits no tenderness  Abdominal: Soft  Neurological: She is alert and oriented to person, place, and time  Skin: Skin is warm and dry  She is not diaphoretic  Psychiatric: She has a normal mood and affect  Discussion/Summary:  Coronary artery disease, cardiac catheterization 2009 after abnormal stress test with anterior ischemia revealed a 60 percent lad lesion, 50 percent diagonal, 50 percent circumflex lesion in stent  , manage medically  Left ventricular systolic function is normal  Stress test in 2014 interestingly so revealed no ischemia  Echocardiogram in 2015 revealed normal left systolic function  Will repeat a stress test   As well as a lipid profile  Peripheral vascular disease extensive    Recent CT angiogram revealed a greater than 70 percent right internal carotid artery stenosis  50 percent left, with bilateral intracranial cavernous disease moderate severity  Right vertebral stenosis, anatomy appears to be mostly unchanged when compared with a CT angio of 4 years ago  Incidental opacities in the upper lungs questionable sarcoid appearance    Will repeat a CT

## 2018-02-08 ENCOUNTER — HOSPITAL ENCOUNTER (OUTPATIENT)
Dept: CT IMAGING | Facility: HOSPITAL | Age: 63
Discharge: HOME | End: 2018-02-08
Attending: INTERNAL MEDICINE
Payer: COMMERCIAL

## 2018-02-08 DIAGNOSIS — E78.00 HYPERCHOLESTEROLEMIA: ICD-10-CM

## 2018-02-08 DIAGNOSIS — I25.10 CORONARY ARTERY DISEASE INVOLVING NATIVE CORONARY ARTERY OF NATIVE HEART WITHOUT ANGINA PECTORIS: ICD-10-CM

## 2018-02-08 PROCEDURE — 71250 CT THORAX DX C-: CPT

## 2018-02-10 DIAGNOSIS — I25.10 CORONARY ARTERY DISEASE INVOLVING NATIVE CORONARY ARTERY OF NATIVE HEART WITHOUT ANGINA PECTORIS: Primary | ICD-10-CM

## 2018-02-12 RX ORDER — SIMVASTATIN 40 MG
TABLET ORAL
Qty: 90 TABLET | Refills: 2 | Status: SHIPPED | OUTPATIENT
Start: 2018-02-12 | End: 2018-11-18 | Stop reason: SDUPTHER

## 2018-02-14 DIAGNOSIS — I73.9 CLAUDICATION (HCC): Primary | ICD-10-CM

## 2018-02-14 RX ORDER — CLOPIDOGREL BISULFATE 75 MG/1
TABLET ORAL
Qty: 90 TABLET | Refills: 0 | Status: SHIPPED | OUTPATIENT
Start: 2018-02-14 | End: 2018-05-14 | Stop reason: SDUPTHER

## 2018-02-21 ENCOUNTER — HOSPITAL ENCOUNTER (OUTPATIENT)
Dept: NON INVASIVE DIAGNOSTICS | Facility: CLINIC | Age: 63
Discharge: HOME | End: 2018-02-21
Payer: COMMERCIAL

## 2018-02-21 DIAGNOSIS — I25.10 CORONARY ARTERY DISEASE INVOLVING NATIVE CORONARY ARTERY OF NATIVE HEART WITHOUT ANGINA PECTORIS: ICD-10-CM

## 2018-02-21 DIAGNOSIS — E78.00 HYPERCHOLESTEROLEMIA: ICD-10-CM

## 2018-02-21 PROCEDURE — 93351 STRESS TTE COMPLETE: CPT | Performed by: INTERNAL MEDICINE

## 2018-02-21 PROCEDURE — 93350 STRESS TTE ONLY: CPT

## 2018-02-22 LAB
ARRHY DURING EX: NORMAL
CHEST PAIN STATEMENT: NORMAL
MAX DIASTOLIC BP: 70 MMHG
MAX HEART RATE: 118 BPM
MAX PREDICTED HEART RATE: 160 BPM
MAX. SYSTOLIC BP: 220 MMHG
PROTOCOL NAME: NORMAL
REASON FOR TERMINATION: NORMAL
TARGET HR FORMULA: NORMAL
TEST INDICATION: NORMAL
TIME IN EXERCISE PHASE: NORMAL

## 2018-03-05 ENCOUNTER — OFFICE VISIT (OUTPATIENT)
Dept: FAMILY MEDICINE CLINIC | Facility: CLINIC | Age: 63
End: 2018-03-05
Payer: COMMERCIAL

## 2018-03-05 VITALS
DIASTOLIC BLOOD PRESSURE: 60 MMHG | HEIGHT: 64 IN | WEIGHT: 175 LBS | HEART RATE: 64 BPM | BODY MASS INDEX: 29.88 KG/M2 | SYSTOLIC BLOOD PRESSURE: 120 MMHG

## 2018-03-05 DIAGNOSIS — I10 ESSENTIAL HYPERTENSION: ICD-10-CM

## 2018-03-05 DIAGNOSIS — H26.9 CATARACT OF BOTH EYES, UNSPECIFIED CATARACT TYPE: ICD-10-CM

## 2018-03-05 DIAGNOSIS — E10.40 TYPE 1 DIABETES MELLITUS WITH SENSORY NEUROPATHY (HCC): ICD-10-CM

## 2018-03-05 DIAGNOSIS — Z01.818 PREOP EXAMINATION: Primary | ICD-10-CM

## 2018-03-05 DIAGNOSIS — I25.10 CORONARY ARTERY DISEASE INVOLVING NATIVE CORONARY ARTERY OF NATIVE HEART WITHOUT ANGINA PECTORIS: ICD-10-CM

## 2018-03-05 PROBLEM — E78.5 HYPERLIPIDEMIA: Status: ACTIVE | Noted: 2018-02-01

## 2018-03-05 PROCEDURE — 99243 OFF/OP CNSLTJ NEW/EST LOW 30: CPT | Performed by: PHYSICIAN ASSISTANT

## 2018-03-05 RX ORDER — TRIPROLIDINE/PSEUDOEPHEDRINE 2.5MG-60MG
TABLET ORAL
COMMUNITY
Start: 2018-02-23 | End: 2018-05-20 | Stop reason: ALTCHOICE

## 2018-03-05 RX ORDER — OFLOXACIN 3 MG/ML
SOLUTION/ DROPS OPHTHALMIC
COMMUNITY
Start: 2018-02-23 | End: 2018-10-22 | Stop reason: ALTCHOICE

## 2018-03-05 RX ORDER — KETOROLAC TROMETHAMINE 5 MG/ML
SOLUTION OPHTHALMIC
COMMUNITY
Start: 2018-02-23 | End: 2018-05-20 | Stop reason: ALTCHOICE

## 2018-03-05 RX ORDER — CALCIUM CARB/VITAMIN D3/VIT K1 500-100-40
TABLET,CHEWABLE ORAL
COMMUNITY
Start: 2016-11-17

## 2018-03-05 NOTE — PROGRESS NOTES
Assessment/Plan:  Patient Instructions   1  Preop consultation-patient is medically stable at this time for planned procedure on the 3/14/2018 with Dr Amy Ybarra  2   Coronary artery disease-stable status post MI in 2000  Stress echo was performed within the past 2 weeks and within normal limits  3   Type 2 diabetes-stable with hemoglobin A1c of 6 8  patient continues regular follow-up with Endocrinology  4   Hypertension-presently stable with lisinopril and metoprolol  No problem-specific Assessment & Plan notes found for this encounter  Diagnoses and all orders for this visit:    Preop examination    Cataract of both eyes, unspecified cataract type    Coronary artery disease involving native coronary artery of native heart without angina pectoris    Type 1 diabetes mellitus with sensory neuropathy ([address] )    Essential hypertension    Other orders  -     Glucose Blood (IGLUCOSE TEST STRIPS VI); Test 5 times daily, e10 9, pump therapy  -     Insulin Syringe-Needle U-100 (INSULIN SYRINGE 1CC/31GX5/16") 31G X 5/16" 1 ML MISC; Inject 2 times per day  -     ketorolac (ACULAR) 0 5 % ophthalmic solution;   -     ofloxacin (OCUFLOX) 0 3 % ophthalmic solution;   -     DUREZOL 0 05 % EMUL;           Subjective:      Patient ID: Ivelisse Jarrell is a 63 y o  female  Chief complaint:  Pt is here for pre op clearance for cataract surgery to be done on the left eye 3/14   she has had a ekg and a stress echo in the last 30 days   ~cd    HPI:  This is a 82-year-old female who presents to the office for preop consultation for cataract procedure to be performed on the 3/14/2018 with Dr Amy Ybarra  She does have a history of type 1 diabetes which has been stable on insulin therapy  She uses an insulin pump and continuous glucose monitor  She follows with Endocrinology in states that her last hemoglobin A1c was stable at 6 8    She also has had a history of myocardial infarction in 2000 and continues regular follow-up with Cardiology  She just underwent stress echo in the past 2 weeks which was within normal limits  She has no symptoms of angina and has not needed nitroglycerin before  She denies any other signs of infection presently  The following portions of the patient's history were reviewed and updated as appropriate: allergies, current medications, past family history, past medical history, past social history, past surgical history and problem list     Review of Systems   Constitutional: Negative for chills, fatigue and fever  HENT: Negative for congestion, ear pain and sinus pressure  Eyes: Negative for visual disturbance  Respiratory: Negative for cough, chest tightness and shortness of breath  Cardiovascular: Negative for chest pain and palpitations  Gastrointestinal: Negative for diarrhea, nausea and vomiting  Endocrine: Negative for polyuria  Genitourinary: Negative for dysuria and frequency  Musculoskeletal: Negative for arthralgias and myalgias  Skin: Negative for pallor and rash  Neurological: Negative for dizziness, weakness, light-headedness, numbness and headaches  Psychiatric/Behavioral: Negative for agitation, behavioral problems and sleep disturbance  All other systems reviewed and are negative  Objective:  Vitals:    03/05/18 1531   BP: 120/60   BP Location: Left arm   Patient Position: Sitting   Cuff Size: Standard   Pulse: 64   Weight: 79 4 kg (175 lb)   Height: 5' 4" (1 626 m)                Physical Exam   Constitutional: She is oriented to person, place, and time  She appears well-developed and well-nourished  No distress  HENT:   Head: Normocephalic and atraumatic  Right Ear: External ear normal    Left Ear: External ear normal    Nose: Nose normal    Mouth/Throat: Oropharynx is clear and moist  No oropharyngeal exudate  Eyes: Conjunctivae and EOM are normal  Pupils are equal, round, and reactive to light  Neck: Normal range of motion  Neck supple   No tracheal deviation present  No thyromegaly present  Cardiovascular: Normal rate, regular rhythm and normal heart sounds  Exam reveals no friction rub  No murmur heard  Pulmonary/Chest: Effort normal and breath sounds normal  No respiratory distress  She has no wheezes  She has no rales  Abdominal: Soft  Bowel sounds are normal  She exhibits no distension  There is no tenderness  There is no rebound and no guarding  Musculoskeletal: Normal range of motion  She exhibits no edema or tenderness  Lymphadenopathy:     She has no cervical adenopathy  Neurological: She is alert and oriented to person, place, and time  No cranial nerve deficit  Coordination normal    Skin: Skin is warm and dry  No rash noted  No erythema  Psychiatric: She has a normal mood and affect  Her behavior is normal  Thought content normal    Nursing note and vitals reviewed

## 2018-03-05 NOTE — PATIENT INSTRUCTIONS
1  Preop consultation-patient is medically stable at this time for planned procedure on the 3/14/2018 with Dr Wayna Hodgkins  2   Coronary artery disease-stable status post MI in 2000  Stress echo was performed within the past 2 weeks and within normal limits  3   Type 2 diabetes-stable with hemoglobin A1c of 6 8  patient continues regular follow-up with Endocrinology  4   Hypertension-presently stable with lisinopril and metoprolol

## 2018-03-22 ENCOUNTER — HOSPITAL ENCOUNTER (OUTPATIENT)
Dept: NON INVASIVE DIAGNOSTICS | Facility: CLINIC | Age: 63
Discharge: HOME | End: 2018-03-22
Payer: COMMERCIAL

## 2018-03-22 DIAGNOSIS — I65.29 OCCLUSION AND STENOSIS OF CAROTID ARTERY: ICD-10-CM

## 2018-03-22 DIAGNOSIS — I65.23 CAROTID ARTERY STENOSIS, ASYMPTOMATIC, BILATERAL: ICD-10-CM

## 2018-03-24 DIAGNOSIS — I25.10 CORONARY ARTERY DISEASE INVOLVING NATIVE CORONARY ARTERY OF NATIVE HEART WITHOUT ANGINA PECTORIS: Primary | ICD-10-CM

## 2018-04-05 ENCOUNTER — OFFICE VISIT (OUTPATIENT)
Dept: FAMILY MEDICINE CLINIC | Facility: CLINIC | Age: 63
End: 2018-04-05
Payer: COMMERCIAL

## 2018-04-05 VITALS
SYSTOLIC BLOOD PRESSURE: 132 MMHG | BODY MASS INDEX: 30.73 KG/M2 | DIASTOLIC BLOOD PRESSURE: 60 MMHG | WEIGHT: 180 LBS | TEMPERATURE: 98.1 F | HEART RATE: 76 BPM | HEIGHT: 64 IN

## 2018-04-05 DIAGNOSIS — I10 ESSENTIAL HYPERTENSION: ICD-10-CM

## 2018-04-05 DIAGNOSIS — H26.9 CATARACT OF RIGHT EYE, UNSPECIFIED CATARACT TYPE: ICD-10-CM

## 2018-04-05 DIAGNOSIS — E10.40 TYPE 1 DIABETES MELLITUS WITH SENSORY NEUROPATHY (HCC): ICD-10-CM

## 2018-04-05 DIAGNOSIS — I25.10 CORONARY ARTERY DISEASE INVOLVING NATIVE CORONARY ARTERY OF NATIVE HEART WITHOUT ANGINA PECTORIS: ICD-10-CM

## 2018-04-05 DIAGNOSIS — Z01.818 PREOP EXAMINATION: Primary | ICD-10-CM

## 2018-04-05 PROCEDURE — 99243 OFF/OP CNSLTJ NEW/EST LOW 30: CPT | Performed by: PHYSICIAN ASSISTANT

## 2018-04-05 RX ORDER — TIMOLOL MALEATE 5 MG/ML
SOLUTION/ DROPS OPHTHALMIC
COMMUNITY
Start: 2018-03-27 | End: 2018-05-20 | Stop reason: ALTCHOICE

## 2018-04-05 NOTE — PATIENT INSTRUCTIONS
1  Preop consultation-patient is physically stable for cataract surgery as planned on the 4/18/2018 with Dr Jaquan Ding  She did just have the left eye completed in March of 2018  2   Cataract-stable for procedure as above  3   Type 1 diabetes-presently stable per Endocrinology with hemoglobin A1c of 6 8   4   Coronary artery disease-stable-patient with stressed echo on the 2/21/2018 which was within normal limits  5   Hyperlipidemia-stable on statin therapy  6   Hypertension-stable with lisinopril and metoprolol

## 2018-04-05 NOTE — PROGRESS NOTES
Assessment/Plan:  Patient Instructions   1  Preop consultation-patient is physically stable for cataract surgery as planned on the 4/18/2018 with Dr Anastacio Santiago  She did just have the left eye completed in March of 2018  2   Cataract-stable for procedure as above  3   Type 1 diabetes-presently stable per Endocrinology with hemoglobin A1c of 6 8   4   Coronary artery disease-stable-patient with stressed echo on the 2/21/2018 which was within normal limits  5   Hyperlipidemia-stable on statin therapy  6   Hypertension-stable with lisinopril and metoprolol  No problem-specific Assessment & Plan notes found for this encounter  Diagnoses and all orders for this visit:    Preop examination    Cataract of right eye, unspecified cataract type    Type 1 diabetes mellitus with sensory neuropathy ([facility] [address] )    Essential hypertension    Coronary artery disease involving native coronary artery of native heart without angina pectoris    Other orders  -     timolol (TIMOPTIC) 0 5 % ophthalmic solution;   -     DIANE CONTOUR NEXT TEST test strip;           Subjective: Preop Clearance for right eye cataract removal scheduled 4/18  Patient ID: Kalen Llanos is a 63 y o  female  HPI:  This is a 63-year-old female who presents to the office for preop consultation for right cataract procedure to be performed on the 4/18/2018 with Dr Anastacio Santiago  Patient had the left eye performed last month and is interested in getting the other eye completed  She did have a successful procedure and is happy with the result her left eye  She tolerated the anesthesia without difficulty  She does have a history of known cardiac disease which has been stable  She has not had any problems with angina or shortness of breath  She did have a stress echo in late February which was within normal limits  Patient also has a history of hypertension which has been stable with lisinopril and metoprolol    Her cholesterol has been stable with simvastatin  She does continue Plavix and aspirin for thromboembolism prophylaxis  She also has a history of type 1 diabetes which has been stable with insulin pump and Endocrinology follow-up  Her last hemoglobin A1c was 6 8  The following portions of the patient's history were reviewed and updated as appropriate: allergies, current medications, past family history, past medical history, past social history, past surgical history and problem list     Review of Systems   Constitutional: Negative for chills, fatigue and fever  HENT: Negative for congestion, ear pain and sinus pressure  Eyes: Negative for visual disturbance  Respiratory: Negative for cough, chest tightness and shortness of breath  Cardiovascular: Negative for chest pain and palpitations  Gastrointestinal: Negative for diarrhea, nausea and vomiting  Endocrine: Negative for polyuria  Genitourinary: Negative for dysuria and frequency  Musculoskeletal: Negative for arthralgias and myalgias  Skin: Negative for pallor and rash  Neurological: Negative for dizziness, weakness, light-headedness, numbness and headaches  Psychiatric/Behavioral: Negative for agitation, behavioral problems and sleep disturbance  All other systems reviewed and are negative  Objective:      /60   Pulse 76   Temp 98 1 °F (36 7 °C)   Ht 5' 4" (1 626 m)   Wt 81 6 kg (180 lb)   BMI 30 90 kg/m²          Physical Exam   Constitutional: She is oriented to person, place, and time  She appears well-developed and well-nourished  No distress  HENT:   Head: Normocephalic and atraumatic  Right Ear: External ear normal    Left Ear: External ear normal    Nose: Nose normal    Mouth/Throat: Oropharynx is clear and moist  No oropharyngeal exudate  Eyes: Conjunctivae and EOM are normal  Pupils are equal, round, and reactive to light  Neck: Normal range of motion  Neck supple  No tracheal deviation present  No thyromegaly present  Cardiovascular: Normal rate, regular rhythm and normal heart sounds  Exam reveals no friction rub  No murmur heard  Pulmonary/Chest: Effort normal and breath sounds normal  No respiratory distress  She has no wheezes  She has no rales  Abdominal: Soft  Bowel sounds are normal  She exhibits no distension  There is no tenderness  There is no rebound and no guarding  Musculoskeletal: Normal range of motion  She exhibits no edema or tenderness  Lymphadenopathy:     She has no cervical adenopathy  Neurological: She is alert and oriented to person, place, and time  No cranial nerve deficit  Coordination normal    Skin: Skin is warm and dry  No rash noted  No erythema  Psychiatric: She has a normal mood and affect  Her behavior is normal  Thought content normal    Nursing note and vitals reviewed

## 2018-04-12 ENCOUNTER — HOSPITAL ENCOUNTER (OUTPATIENT)
Dept: NON INVASIVE DIAGNOSTICS | Facility: CLINIC | Age: 63
Discharge: HOME | End: 2018-04-12
Payer: COMMERCIAL

## 2018-04-12 PROCEDURE — 93880 EXTRACRANIAL BILAT STUDY: CPT

## 2018-04-13 PROCEDURE — 93880 EXTRACRANIAL BILAT STUDY: CPT | Performed by: SURGERY

## 2018-05-14 DIAGNOSIS — I25.10 CORONARY ARTERY DISEASE INVOLVING NATIVE CORONARY ARTERY OF NATIVE HEART WITHOUT ANGINA PECTORIS: ICD-10-CM

## 2018-05-14 DIAGNOSIS — I73.9 CLAUDICATION (HCC): ICD-10-CM

## 2018-05-14 PROCEDURE — 4010F ACE/ARB THERAPY RXD/TAKEN: CPT | Performed by: FAMILY MEDICINE

## 2018-05-14 RX ORDER — CLOPIDOGREL BISULFATE 75 MG/1
TABLET ORAL
Qty: 90 TABLET | Refills: 0 | Status: SHIPPED | OUTPATIENT
Start: 2018-05-14 | End: 2018-08-20 | Stop reason: SDUPTHER

## 2018-05-14 RX ORDER — LISINOPRIL 10 MG/1
TABLET ORAL
Qty: 90 TABLET | Refills: 2 | Status: SHIPPED | OUTPATIENT
Start: 2018-05-14 | End: 2019-04-09 | Stop reason: CLARIF

## 2018-05-20 ENCOUNTER — OFFICE VISIT (OUTPATIENT)
Dept: URGENT CARE | Facility: MEDICAL CENTER | Age: 63
End: 2018-05-20
Payer: COMMERCIAL

## 2018-05-20 VITALS
SYSTOLIC BLOOD PRESSURE: 182 MMHG | HEIGHT: 65 IN | DIASTOLIC BLOOD PRESSURE: 74 MMHG | TEMPERATURE: 98.9 F | OXYGEN SATURATION: 96 % | WEIGHT: 187 LBS | HEART RATE: 84 BPM | BODY MASS INDEX: 31.16 KG/M2

## 2018-05-20 DIAGNOSIS — J20.9 ACUTE BRONCHITIS, UNSPECIFIED ORGANISM: Primary | ICD-10-CM

## 2018-05-20 PROCEDURE — 99213 OFFICE O/P EST LOW 20 MIN: CPT | Performed by: PHYSICIAN ASSISTANT

## 2018-05-20 RX ORDER — AMOXICILLIN AND CLAVULANATE POTASSIUM 875; 125 MG/1; MG/1
1 TABLET, FILM COATED ORAL EVERY 12 HOURS SCHEDULED
Qty: 14 TABLET | Refills: 0 | Status: SHIPPED | OUTPATIENT
Start: 2018-05-20 | End: 2018-05-27

## 2018-05-20 NOTE — PATIENT INSTRUCTIONS
Take Augmentin twice daily for 7 days  Take with food to avoid GI upset  Follow up with your PCP in 3-5 days  Go to the ER for any distress

## 2018-05-20 NOTE — PROGRESS NOTES
[facility]        NAME: Laura Ontiveros is a 66 y o  female  : 1955    MRN: [id number]  DATE: May 20, 2018  TIME: 5:45 PM    Assessment and Plan   Acute bronchitis, unspecified organism [J20 [id number]]  1  Acute bronchitis, unspecified organism  amoxicillin-clavulanate (AUGMENTIN) 875-125 mg per tablet         Patient Instructions     Take Augmentin twice daily for 7 days  Take with food to avoid GI upset  Follow up with PCP in 3-5 days  Proceed to  ER if symptoms worsen  Chief Complaint     Chief Complaint   Patient presents with    Sore Throat    Cough         History of Present Illness         This is a 59-year-old female presenting for sore throat, ear pain, cough times 3-4 days  Associated symptoms include subjective fever and chills, facial pain, elevated blood sugars  She is a type 1 diabetic with an insulin pump and states that her blood sugar has been continuously in the 200s for the last 5-6 days  She usually runs 120-140  She denies any nausea, vomiting, diarrhea, abdominal pain, headaches, vision changes  Sore Throat    This is a new problem  The current episode started in the past 7 days  The problem has been gradually worsening  Neither side of throat is experiencing more pain than the other  Associated symptoms include congestion, coughing, ear pain, a plugged ear sensation, swollen glands and trouble swallowing  Pertinent negatives include no abdominal pain, diarrhea, drooling, ear discharge, headaches, hoarse voice, neck pain, shortness of breath, stridor or vomiting  Cough   This is a new problem  The current episode started in the past 7 days  The problem has been gradually worsening  The problem occurs constantly  The cough is productive of sputum  Associated symptoms include chills, ear congestion, ear pain, a fever, myalgias, nasal congestion, postnasal drip, rhinorrhea and a sore throat   Pertinent negatives include no chest pain, headaches, heartburn, hemoptysis, rash, shortness of breath, sweats, weight loss or wheezing  There is no history of asthma or COPD  Review of Systems   Review of Systems   Constitutional: Positive for chills and fever  Negative for weight loss  HENT: Positive for congestion, ear pain, postnasal drip, rhinorrhea, sore throat and trouble swallowing  Negative for drooling, ear discharge and hoarse voice  Respiratory: Positive for cough  Negative for hemoptysis, shortness of breath, wheezing and stridor  Cardiovascular: Negative for chest pain  Gastrointestinal: Negative for abdominal pain, diarrhea, heartburn and vomiting  Musculoskeletal: Positive for myalgias  Negative for neck pain  Skin: Negative for rash  Neurological: Negative for headaches  Current Medications       Current Outpatient Prescriptions:     amoxicillin-clavulanate (AUGMENTIN) 875-125 mg per tablet, Take 1 tablet by mouth every 12 (twelve) hours for 7 days, Disp: 14 tablet, Rfl: 0    aspirin 81 MG tablet, Take 1 tablet by mouth daily, Disp: , Rfl:     DIANE CONTOUR NEXT TEST test strip, , Disp: , Rfl:     clopidogrel (PLAVIX) 75 mg tablet, TAKE 1 TABLET DAILY, Disp: 90 tablet, Rfl: 0    Glucose Blood (IGLUCOSE TEST STRIPS VI), Test 5 times daily, e10 9, pump therapy, Disp: , Rfl:     insulin lispro (HUMALOG) 100 units/mL injection, Inject under the skin, Disp: , Rfl:     Insulin Syringe-Needle U-100 (INSULIN SYRINGE 1CC/31GX5/16") 31G X 5/16" 1 ML MISC, Inject 2 times per day, Disp: , Rfl:     lisinopril (ZESTRIL) 10 mg tablet, TAKE 1 TABLET DAILY, Disp: 90 tablet, Rfl: 2    metoprolol tartrate (LOPRESSOR) 25 mg tablet, TAKE 1 TABLET TWICE A DAY, Disp: 180 tablet, Rfl: 0    niacin (NIASPAN) 500 mg CR tablet, Take 1 tablet by mouth daily, Disp: , Rfl:     nitroglycerin (NITROLINGUAL) 0 4 mg/spray spray, Nitroglycerin 400 MCG/SPRAY Translingual Aerosol Solution; PRN CHEST PAIN; 90; 3; 18-Feb-2016; 18-Feb-2016; [facility];  Active, Disp: , Rfl:   ofloxacin (OCUFLOX) 0 3 % ophthalmic solution, , Disp: , Rfl:     simvastatin (ZOCOR) 40 mg tablet, TAKE 1 TABLET DAILY, Disp: 90 tablet, Rfl: 2    Current Allergies     Allergies as of 2018 - Reviewed 2018   Allergen Reaction Noted    Codeine Nausea Only 2012            The following portions of the patient's history were reviewed and updated as appropriate: allergies, current medications, past family history, past medical history, past social history, past surgical history and problem list      Past Medical History:   Diagnosis Date    Acute myocardial infarction [facility])     Last Assessed: 2012    Carotid artery occlusion     Coronary artery disease     Diabetes mellitus ([facility] [address] )     Ectopic pregnancy     Onset:     Hyperlipidemia     Hypertension        Past Surgical History:   Procedure Laterality Date     SECTION      Onset:     CLOSED REDUCTION METATARSAL FRACTURE Left     5th toe; Onset: 2012    CORONARY ANGIOPLASTY      CORONARY ANGIOPLASTY WITH STENT PLACEMENT      Onset:     TUBAL LIGATION      Onset:        Family History   Problem Relation Age of Onset    Arrhythmia Father      Myocardial Infarction Arrhythmias    Prostate cancer Father     Other Family      Coronary Arterial Triple Bypass Graft         Medications have been verified  Objective   BP (!) 182/74 (BP Location: Right arm, Patient Position: Sitting, Cuff Size: Standard)   Pulse 84   Temp 98 9 °F (37 2 °C) (Temporal)   Ht 5' 5" (1 651 m)   Wt 84 8 kg (187 lb)   SpO2 96%   BMI 31 12 kg/m²        Physical Exam     Physical Exam   Constitutional: She appears well-developed and well-nourished  No distress  HENT:   Head: Normocephalic and atraumatic  Right Ear: Tympanic membrane, external ear and ear canal normal  No drainage or tenderness  Left Ear: Tympanic membrane, external ear and ear canal normal  No drainage or tenderness     Nose: Mucosal edema and rhinorrhea present  Right sinus exhibits maxillary sinus tenderness and frontal sinus tenderness  Left sinus exhibits maxillary sinus tenderness and frontal sinus tenderness  Mouth/Throat: Uvula is midline and mucous membranes are normal  Posterior oropharyngeal edema and posterior oropharyngeal erythema present  No oropharyngeal exudate  Eyes: Conjunctivae are normal  Pupils are equal, round, and reactive to light  Neck: Normal range of motion  Neck supple  Cardiovascular: Normal rate, regular rhythm and normal heart sounds  Pulmonary/Chest: Effort normal and breath sounds normal  No respiratory distress  She has no decreased breath sounds  She has no wheezes  She has no rhonchi  She has no rales  Lymphadenopathy:     She has no cervical adenopathy  Neurological: She is alert  Skin: Skin is warm and dry  She is not diaphoretic  Nursing note and vitals reviewed

## 2018-05-20 NOTE — PROGRESS NOTES
Presents with sore throat for 4 days, then ear soreness and cough  Has body aches as well  States BS has been elevated dispite not eating  Has continuous monitor

## 2018-06-05 ENCOUNTER — OFFICE VISIT (OUTPATIENT)
Dept: FAMILY MEDICINE CLINIC | Facility: CLINIC | Age: 63
End: 2018-06-05
Payer: COMMERCIAL

## 2018-06-05 VITALS
DIASTOLIC BLOOD PRESSURE: 62 MMHG | HEART RATE: 80 BPM | SYSTOLIC BLOOD PRESSURE: 170 MMHG | WEIGHT: 180 LBS | BODY MASS INDEX: 29.95 KG/M2 | TEMPERATURE: 99 F

## 2018-06-05 DIAGNOSIS — H92.03 ACUTE EAR PAIN, BILATERAL: ICD-10-CM

## 2018-06-05 DIAGNOSIS — N39.0 URINARY TRACT INFECTION WITHOUT HEMATURIA, SITE UNSPECIFIED: ICD-10-CM

## 2018-06-05 DIAGNOSIS — E10.40 TYPE 1 DIABETES MELLITUS WITH SENSORY NEUROPATHY (HCC): ICD-10-CM

## 2018-06-05 DIAGNOSIS — J02.9 SORE THROAT: Primary | ICD-10-CM

## 2018-06-05 PROCEDURE — 99213 OFFICE O/P EST LOW 20 MIN: CPT | Performed by: FAMILY MEDICINE

## 2018-06-05 RX ORDER — CEPHALEXIN 500 MG/1
500 CAPSULE ORAL EVERY 8 HOURS SCHEDULED
Refills: 0
Start: 2018-06-05 | End: 2018-06-12

## 2018-06-05 RX ORDER — CEPHALEXIN 500 MG/1
500 CAPSULE ORAL
COMMUNITY
Start: 2018-06-01 | End: 2018-06-05

## 2018-06-05 NOTE — ASSESSMENT & PLAN NOTE
Ear examination is normal, I am suspecting this  euschtation tube dysfunction, advised her to use Flonase if symptoms not better

## 2018-06-05 NOTE — PATIENT INSTRUCTIONS
Sore Throat, Ambulatory Care   GENERAL INFORMATION:   A sore throat  is often caused by a cold or flu virus  A sore throat may also be caused by bacteria such as strep  Other causes include smoking, a runny nose, allergies, or acid reflux  Seek immediate care for the following symptoms:   · Trouble breathing or swallowing because your throat is swollen or sore    · Drooling because it hurts too much to swallow    · A painful lump in your throat that does not go away after 5 days    · A fever higher than 102? F (39?C) or lasts longer than 3 days    · Confusion    · Blood in your throat or ear  Treatment for a sore throat  will depend on the cause how severe it is  A sore throat cause by a virus will go away on its own without treatment  You will need antibiotics if your sore throat is caused by bacteria  Your sore throat should start to feel better within 3 to 5 days for both viral and bacterial infections  Care for your sore throat:   · Gargle with salt water  Mix ¼ teaspoon salt in a glass of warm water and gargle  This may help reduce swelling in your throat  · Take ibuprofen or acetaminophen:  These medicines decrease pain and fever  They are available without a doctor's order  Ask your healthcare provider which medicine is best for you  Ask how much to take and how often to take it  · Drink more liquids  Cold or warm drinks may help soothe your sore throat  Drinking liquids can also help prevent dehydration  · Use a cool-steam humidifier  to help moisten the air in your room and reduce your throat pain  · Use lozenges, ice, soft foods, or popsicles  to soothe your throat  · Rest your throat as much as possible  Try not to use your voice  This may irritate your throat and worsen your symptoms  Follow up with your healthcare provider as directed:  Write down your questions so you remember to ask them during your visits  CARE AGREEMENT:   You have the right to help plan your care   Learn about your health condition and how it may be treated  Discuss treatment options with your caregivers to decide what care you want to receive  You always have the right to refuse treatment  The above information is an  only  It is not intended as medical advice for individual conditions or treatments  Talk to your doctor, nurse or pharmacist before following any medical regimen to see if it is safe and effective for you  © 2014 [address] is for End User's use only and may not be sold, redistributed or otherwise used for commercial purposes  All illustrations and images included in CareNotes® are the copyrighted property of [employer]  or Adrian Oliveira

## 2018-06-05 NOTE — PROGRESS NOTES
Assessment/Plan:    Sore throat  No evidence of bacterial pharyngitis, advised on symptomatic treatment take Tylenol around the clock for her sore throat, magic mouthwash was given, patient already on Keflex for her UTI from the urologist to continue  Acute ear pain, bilateral  Ear examination is normal, I am suspecting this  euschtation tube dysfunction, advised her to use Flonase if symptoms not better  Urinary tract infection without hematuria  This is managed by Urology, urine culture showed Klebsiella pneumonia, patient on Keflex  Type 1 diabetes mellitus with sensory neuropathy (HCC)  Blood sugar is very well controlled, we reviewed the insulin pump, her blood sugar for the last 3 hours ranging between 100-150  Diagnoses and all orders for this visit:    Sore throat  -     al mag oxide-diphenhydramine-lidocaine viscous (MAGIC MOUTHWASH) 1:1:1 suspension; Swish and spit 10 mL every 4 (four) hours as needed for mouth pain or discomfort    Acute ear pain, bilateral    Type 1 diabetes mellitus with sensory neuropathy (HCC)    Urinary tract infection without hematuria, site unspecified  -     cephalexin (KEFLEX) 500 mg capsule; Take 1 capsule (500 mg total) by mouth every 8 (eight) hours for 7 days    Other orders  -     Discontinue: cephalexin (KEFLEX) 500 mg capsule; Take 500 mg by mouth          Subjective: patient states that 3 weeks ago she had sore throat, fever, ear pain, swollen glands,jaw pain and difficulty swallowing  [facility] which she took for 7 days  A week after finishing it symptoms came back  (patient is also on keflex for a UTI that [facility] diagnosed on 6/1/18)  ak     Patient ID: Mile Darling is a 66 y o  female      Patient's symptoms started 4 weeks ago with sore throat, she was seen at a urgent care, was given Augmentin, her symptoms improved and then it returned back, patient has difficulty swallowing, has some neck pain, and now ear pain bilaterally, low-grade fever for the last week, patient currently on the Keflex for the last 5 days due to her urinary tract infection from the urologist office  Sore Throat    This is a new problem  The current episode started 1 to 4 weeks ago  The problem has been gradually worsening  Maximum temperature: low grade fever  Associated symptoms include ear pain and trouble swallowing  Pertinent negatives include no abdominal pain, congestion, diarrhea, drooling, ear discharge, headaches, neck pain, shortness of breath or vomiting  The following portions of the patient's history were reviewed and updated as appropriate: allergies, current medications, past family history, past medical history, past social history, past surgical history and problem list     Review of Systems   Constitutional: Positive for chills and fatigue  Negative for activity change, appetite change and fever  HENT: Positive for ear pain, sore throat, trouble swallowing and voice change  Negative for congestion, drooling, ear discharge, rhinorrhea, sinus pain and sneezing  Respiratory: Negative for chest tightness, shortness of breath and wheezing  Cardiovascular: Negative for chest pain and leg swelling  Gastrointestinal: Negative for abdominal pain, diarrhea, nausea and vomiting  Genitourinary: Negative for dysuria  Musculoskeletal: Negative for neck pain  Skin: Negative for rash  Neurological: Negative for headaches  Objective:      /62   Pulse 80   Temp 99 °F (37 2 °C)   Wt 81 6 kg (180 lb)   BMI 29 95 kg/m²          Physical Exam   Constitutional: She is oriented to person, place, and time  She appears well-developed and well-nourished  HENT:   Head: Normocephalic and atraumatic  Eyes: Conjunctivae and EOM are normal  Pupils are equal, round, and reactive to light  Neck: Normal range of motion  Neck supple  Cardiovascular: Normal rate and regular rhythm      Pulmonary/Chest: Effort normal and breath sounds normal    Musculoskeletal: Normal range of motion  Neurological: She is alert and oriented to person, place, and time  Nursing note and vitals reviewed

## 2018-06-05 NOTE — ASSESSMENT & PLAN NOTE
Blood sugar is very well controlled, we reviewed the insulin pump, her blood sugar for the last 3 hours ranging between 100-150

## 2018-06-05 NOTE — ASSESSMENT & PLAN NOTE
No evidence of bacterial pharyngitis, advised on symptomatic treatment take Tylenol around the clock for her sore throat, magic mouthwash was given, patient already on Keflex for her UTI from the urologist to continue

## 2018-08-20 DIAGNOSIS — I73.9 CLAUDICATION (HCC): ICD-10-CM

## 2018-08-20 RX ORDER — CLOPIDOGREL BISULFATE 75 MG/1
TABLET ORAL
Qty: 90 TABLET | Refills: 0 | Status: SHIPPED | OUTPATIENT
Start: 2018-08-20 | End: 2019-04-02 | Stop reason: CLARIF

## 2018-08-31 DIAGNOSIS — I25.10 CORONARY ARTERY DISEASE INVOLVING NATIVE CORONARY ARTERY OF NATIVE HEART WITHOUT ANGINA PECTORIS: ICD-10-CM

## 2018-09-06 ENCOUNTER — APPOINTMENT (EMERGENCY)
Dept: RADIOLOGY | Facility: HOSPITAL | Age: 63
End: 2018-09-06
Payer: OTHER MISCELLANEOUS

## 2018-09-06 ENCOUNTER — HOSPITAL ENCOUNTER (EMERGENCY)
Facility: HOSPITAL | Age: 63
Discharge: HOME | End: 2018-09-06
Attending: EMERGENCY MEDICINE | Admitting: EMERGENCY MEDICINE
Payer: OTHER MISCELLANEOUS

## 2018-09-06 VITALS
OXYGEN SATURATION: 97 % | RESPIRATION RATE: 16 BRPM | TEMPERATURE: 98.7 F | DIASTOLIC BLOOD PRESSURE: 68 MMHG | SYSTOLIC BLOOD PRESSURE: 152 MMHG | HEART RATE: 67 BPM

## 2018-09-06 DIAGNOSIS — S52.501A FRACTURE OF RIGHT DISTAL RADIUS: Primary | ICD-10-CM

## 2018-09-06 PROCEDURE — 73110 X-RAY EXAM OF WRIST: CPT

## 2018-09-06 PROCEDURE — 99283 EMERGENCY DEPT VISIT LOW MDM: CPT

## 2018-09-06 NOTE — ED PROVIDER NOTES
History  Chief Complaint   Patient presents with    Wrist Injury     pt started to fall going up her steps and caught herself with right hand, pt stated that she "heard a crack" pain 6/10  Pt takes Plavix     59-year-old female here for fall on outstretched hands  Has right wrist pain  This occurred about 2 hours prior to arrival   She felt a crack  She has full range of motion and trace swelling  She tried applying ice with no relief  She has prior fracture of which she believes was the ulna  No fracture of the radius  Denies numbness tingling or weakness  States she just wants to be sure it is not broken  Does not take any anticoagulants or antiplatelets  No other injuries reported  No head injury  No loss of consciousness  History provided by:  Patient   used: No    Wrist Injury - Major   Location:  Wrist  Wrist location:  R wrist  Injury: yes    Time since incident:  2 hours  Mechanism of injury: fall    Fall:     Fall occurred:  Tripped    Height of fall:  Standing    Impact surface:  Hard floor    Point of impact:  Outstretched arms    Entrapped after fall: no    Pain details:     Quality:  Aching    Radiates to:  Does not radiate    Severity:  Moderate    Onset quality:  Gradual    Duration:  2 hours    Timing:  Constant    Progression:  Unchanged  Handedness:  Right-handed  Dislocation: no    Foreign body present:  No foreign bodies  Tetanus status:  Unknown  Prior injury to area:  Yes  Relieved by:  Rest  Worsened by: Movement  Ineffective treatments: Ice  Associated symptoms: no back pain, no decreased range of motion, no fatigue, no fever, no muscle weakness, no neck pain, no numbness, no stiffness, no swelling and no tingling    Risk factors: no concern for non-accidental trauma, no known bone disorder, no frequent fractures and no recent illness        Prior to Admission Medications   Prescriptions Last Dose Informant Patient Reported? Taking?    DIANE CONTOUR NEXT TEST test strip  Self Yes No   Glucose Blood (IGLUCOSE TEST STRIPS VI)  Self Yes No   Sig: Test 5 times daily, e10 9, pump therapy   Insulin Syringe-Needle U-100 (INSULIN SYRINGE 1CC/31GX5/16") 31G X 5/16" 1 ML MISC  Self Yes No   Sig: Inject 2 times per day   al mag oxide-diphenhydramine-lidocaine viscous (MAGIC MOUTHWASH) 1:1:1 suspension   No No   Sig: Swish and spit 10 mL every 4 (four) hours as needed for mouth pain or discomfort   aspirin 81 MG tablet  Self Yes No   Sig: Take 1 tablet by mouth daily   clopidogrel (PLAVIX) 75 mg tablet   No No   Sig: TAKE 1 TABLET DAILY   insulin lispro (HUMALOG) 100 units/mL injection  Self Yes No   Sig: Inject under the skin     lisinopril (ZESTRIL) 10 mg tablet   No No   Sig: TAKE 1 TABLET DAILY   metoprolol tartrate (LOPRESSOR) 25 mg tablet   No No   Sig: TAKE 1 TABLET TWICE A DAY   niacin (NIASPAN) 500 mg CR tablet  Self Yes No   Sig: Take 1 tablet by mouth daily   nitroglycerin (NITROLINGUAL) 0 4 mg/spray spray  Self Yes No   Sig: Nitroglycerin 400 MCG/SPRAY Translingual Aerosol Solution; PRN CHEST PAIN; 90; 3; 2016; 2016; [facility]; Active   ofloxacin (OCUFLOX) 0 3 % ophthalmic solution  Self Yes No   simvastatin (ZOCOR) 40 mg tablet  Self No No   Sig: TAKE 1 TABLET DAILY      Facility-Administered Medications: None       Past Medical History:   Diagnosis Date    Acute myocardial infarction [facility])     Last Assessed: 2012    Carotid artery occlusion     Coronary artery disease     Diabetes mellitus ([facility] [address] )     Ectopic pregnancy     Onset:     Hyperlipidemia     Hypertension        Past Surgical History:   Procedure Laterality Date     SECTION      Onset:     CLOSED REDUCTION METATARSAL FRACTURE Left     5th toe;  Onset: 2012    CORONARY ANGIOPLASTY      CORONARY ANGIOPLASTY WITH STENT PLACEMENT      Onset:     TUBAL LIGATION      Onset:        Family History   Problem Relation Age of Onset    Arrhythmia Father         Myocardial Infarction Arrhythmias    Prostate cancer Father     Other Family         Coronary Arterial Triple Bypass Graft     I have reviewed and agree with the history as documented  Social History   Substance Use Topics    Smoking status: Former Smoker    Smokeless tobacco: Never Used      Comment: Former smoker per [employer]    Alcohol use Yes      Comment: social        Review of Systems   Constitutional: Negative for activity change, appetite change, chills, diaphoresis, fatigue, fever and unexpected weight change  HENT: Negative for congestion, rhinorrhea, sinus pressure, sore throat and trouble swallowing  Eyes: Negative for photophobia and visual disturbance  Respiratory: Negative for apnea, cough, choking, chest tightness, shortness of breath, wheezing and stridor  Cardiovascular: Negative for chest pain, palpitations and leg swelling  Gastrointestinal: Negative for abdominal distention, abdominal pain, blood in stool, constipation, diarrhea, nausea and vomiting  Genitourinary: Negative for decreased urine volume, difficulty urinating, dysuria, enuresis, flank pain, frequency, hematuria and urgency  Musculoskeletal: Negative for arthralgias, back pain, myalgias, neck pain, neck stiffness and stiffness  Right wrist injury   Skin: Negative for color change, pallor, rash and wound  Allergic/Immunologic: Negative  Neurological: Negative for dizziness, tremors, syncope, weakness, light-headedness, numbness and headaches  Hematological: Negative  Psychiatric/Behavioral: Negative  All other systems reviewed and are negative  Physical Exam  Physical Exam   Constitutional: She is oriented to person, place, and time  She appears well-developed and well-nourished  Non-toxic appearance  She does not have a sickly appearance  She does not appear ill  No distress  HENT:   Head: Normocephalic and atraumatic     Eyes: EOM and lids are normal  Pupils are equal, round, and reactive to light  Neck: Normal range of motion  Neck supple  Cardiovascular: Normal rate, regular rhythm, S1 normal, S2 normal, normal heart sounds, intact distal pulses and normal pulses  Exam reveals no gallop, no distant heart sounds, no friction rub and no decreased pulses  No murmur heard  Pulses:       Radial pulses are 2+ on the right side, and 2+ on the left side  Pulmonary/Chest: Effort normal and breath sounds normal  No accessory muscle usage  No apnea, no tachypnea and no bradypnea  No respiratory distress  She has no decreased breath sounds  She has no wheezes  She has no rhonchi  She has no rales  Abdominal: Soft  Normal appearance  She exhibits no distension  There is no tenderness  There is no rigidity, no rebound and no guarding  Musculoskeletal: Normal range of motion  She exhibits no edema or deformity  Right wrist: She exhibits tenderness and bony tenderness  She exhibits normal range of motion, no swelling, no effusion, no crepitus, no deformity and no laceration  Right hand: Normal    Normal radial, ulnar, median nerve function of right hand  Small skin tear on distal pad of right fifth finger  Neurological: She is alert and oriented to person, place, and time  No cranial nerve deficit  GCS eye subscore is 4  GCS verbal subscore is 5  GCS motor subscore is 6  Skin: Skin is warm, dry and intact  No rash noted  She is not diaphoretic  No erythema  No pallor  Psychiatric: Her speech is normal    Nursing note and vitals reviewed        Vital Signs  ED Triage Vitals [09/06/18 1526]   Temperature Pulse Respirations Blood Pressure SpO2   98 7 °F (37 1 °C) 67 16 152/68 97 %      Temp Source Heart Rate Source Patient Position - Orthostatic VS BP Location FiO2 (%)   Oral Monitor Sitting Left arm --      Pain Score       6           Vitals:    09/06/18 1526   BP: 152/68   Pulse: 67   Patient Position - Orthostatic VS: Sitting       Visual Acuity      ED Medications  Medications - No data to display    Diagnostic Studies  Results Reviewed     None                 XR wrist 3+ views RIGHT   ED Interpretation by Reba Gaona PA-C (09/06 3351)   Fracture distal radius      Final Result by Andre Walters MD (09/06 4913)      Acute nondisplaced distal radial fracture at the metadiaphyseal junction  Suspected hairline fracture of the ulnar styloid with longitudinal orientation noted  Workstation performed: [id number]                    Procedures  Static Splint Application  Date/Time: 9/6/2018 4:44 PM  Performed by: Pita Curry  Authorized by: Pita Curry     Patient location:  ED  Procedure performed by emergency physician: No    Other Assisting Provider: Yes (comment)    Consent:     Consent obtained:  Verbal    Consent given by:  Patient    Risks discussed:  Discoloration, numbness, pain and swelling    Alternatives discussed:  No treatment  Universal protocol:     Procedure explained and questions answered to patient or proxy's satisfaction: yes      Patient identity confirmed:  Arm band, hospital-assigned identification number and verbally with patient  Indication:     Indications: fracture    Pre-procedure details:     Sensation:  Normal  Procedure details:     Laterality:  Right    Location:  Wrist    Splint type:  Volar short arm    Supplies:  Ortho-Glass  Post-procedure details:     Pain:  Unchanged    Sensation:  Normal    Neurovascular Exam: skin pink, capillary refill <2 sec, normal pulses and skin intact, warm, and dry      Patient tolerance of procedure:   Tolerated well, no immediate complications           Phone Contacts  ED Phone Contact    ED Course                               MDM  Number of Diagnoses or Management Options  Fracture of right distal radius: new and requires workup  Diagnosis management comments: Differential diagnosis including but not limited to: sprain, strain, fracture, dislocation, contusion  Plan: XR  Offered analgesia but patient declined  Skin tear on distal pad of right fifth finger shows no skin avulsion  Amount and/or Complexity of Data Reviewed  Tests in the radiology section of CPT®: ordered and reviewed  Independent visualization of images, tracings, or specimens: yes    Risk of Complications, Morbidity, and/or Mortality  Presenting problems: low  Management options: low  General comments: 66year-old fall on outstretched hands  X-ray shows fracture of distal radius  Placed in splint  Neurovascular intact afterwards  Needs follow up with Orthopedics  Discussed return parameters  Discussed pain control  Patient understands and agrees with plan  There is no significant angulation or dislocation, no reduction necessary at this time  Patient Progress  Patient progress: stable    CritCare Time    Disposition  Final diagnoses:   Fracture of right distal radius     Time reflects when diagnosis was documented in both MDM as applicable and the Disposition within this note     Time User Action Codes Description Comment    9/6/2018  4:22 PM Tonio Short Add [X17 470A] Fracture of right distal radius       ED Disposition     ED Disposition Condition Comment    Discharge  Amira Cerna discharge to home/self care  Condition at discharge: Good        Follow-up Information     Follow up With Specialties Details Why [address] [facility] Call for fracture of right distal radius [address] [facility] [place] [address]  [phone]          Patient's Medications   Discharge Prescriptions    No medications on file     No discharge procedures on file      ED Provider  Electronically Signed by           Angie Woodard PA-C  09/06/18 8063

## 2018-09-06 NOTE — DISCHARGE INSTRUCTIONS
Return to the Emergency Department sooner if increased pain, swelling, numbness, weakness, vomiting, difficulty breathing, redness  Ice, elevate  Wrist Fracture in Adults   WHAT YOU NEED TO KNOW:   A wrist fracture is a break in one or more of the bones in your wrist    DISCHARGE INSTRUCTIONS:   Return to the emergency department if:   · Your pain gets worse or does not get better after you take pain medicine  · Your cast or splint breaks, gets wet, or is damaged  · Your hand or fingers feel numb or cold  · Your hand or fingers turn white or blue  · Your splint or cast feels too tight  · You have more pain or swelling after the cast or splint is put on  Contact your healthcare provider if:   · You have a fever  · There is a foul smell or blood coming from under the cast     · You have questions or concerns about your condition or care  Medicines:   · Prescription pain medicine  may be given  Ask your healthcare provider how to take this medicine safely  Some prescription pain medicines contain acetaminophen  Do not take other medicines that contain acetaminophen without talking to your healthcare provider  Too much acetaminophen may cause liver damage  Prescription pain medicine may cause constipation  Ask your healthcare provider how to prevent or treat constipation  · NSAIDs , such as ibuprofen, help decrease swelling, pain, and fever  NSAIDs can cause stomach bleeding or kidney problems in certain people  If you take blood thinner medicine, always ask your healthcare provider if NSAIDs are safe for you  Always read the medicine label and follow directions  · Acetaminophen  decreases pain and fever  It is available without a doctor's order  Ask how much to take and how often to take it  Follow directions   Read the labels of all other medicines you are using to see if they also contain acetaminophen, or ask your doctor or pharmacist  Acetaminophen can cause liver damage if not taken correctly  Do not use more than 4 grams (4,000 milligrams) total of acetaminophen in one day  · Take your medicine as directed  Contact your healthcare provider if you think your medicine is not helping or if you have side effects  Tell him or her if you are allergic to any medicine  Keep a list of the medicines, vitamins, and herbs you take  Include the amounts, and when and why you take them  Bring the list or the pill bottles to follow-up visits  Carry your medicine list with you in case of an emergency  Self-care:   · Rest  as much as possible  Do not play contact sports until the healthcare provider says it is okay  · Apply ice  on your wrist for 15 to 20 minutes every hour or as directed  Use an ice pack, or put crushed ice in a plastic bag  Cover it with a towel before you place it on your skin  Ice helps prevent tissue damage and decreases swelling and pain  · Elevate  your wrist above the level of your heart as often as possible  This will help decrease swelling and pain  Prop your wrist on pillows or blankets to keep it elevated comfortably  Cast or splint care:   · You may take a bath or shower as directed  Do not let your cast or splint get wet  Before bathing, cover the cast or splint with 2 plastic trash bags  Tape the bags to your skin above the cast or splint to seal out the water  Keep your arm out of the water in case the bag breaks  If a plaster cast gets wet and soft, call your healthcare provider  · Check the skin around the cast or splint every day  You may put lotion on any red or sore areas  · Do not push down or lean on the cast or brace because it may break  · Do not  scratch the skin under the cast by putting a sharp or pointed object inside the cast   Go to physical therapy as directed: You may need physical therapy after your wrist heals and the cast is removed   A physical therapist can teach you exercises to help improve movement and strength and to decrease pain  Follow up with your healthcare provider or bone specialist as directed: You may need to return to have your cast removed  You may also need an x-ray to check how well the bone has healed  Write down your questions so you remember to ask them during your visits  © 2017 [address] [employer] Information is for End User's use only and may not be sold, redistributed or otherwise used for commercial purposes  All illustrations and images included in CareNotes® are the copyrighted property of [employer] , [employer]  or Adrian Oliveira  The above information is an  only  It is not intended as medical advice for individual conditions or treatments  Talk to your doctor, nurse or pharmacist before following any medical regimen to see if it is safe and effective for you

## 2018-10-19 DIAGNOSIS — I65.23 BILATERAL CAROTID ARTERY STENOSIS: Primary | ICD-10-CM

## 2018-10-22 ENCOUNTER — APPOINTMENT (INPATIENT)
Dept: NON INVASIVE DIAGNOSTICS | Facility: HOSPITAL | Age: 63
DRG: 176 | End: 2018-10-22
Payer: COMMERCIAL

## 2018-10-22 ENCOUNTER — APPOINTMENT (EMERGENCY)
Dept: RADIOLOGY | Facility: HOSPITAL | Age: 63
DRG: 176 | End: 2018-10-22
Payer: COMMERCIAL

## 2018-10-22 ENCOUNTER — APPOINTMENT (EMERGENCY)
Dept: CT IMAGING | Facility: HOSPITAL | Age: 63
DRG: 176 | End: 2018-10-22
Payer: COMMERCIAL

## 2018-10-22 ENCOUNTER — HOSPITAL ENCOUNTER (INPATIENT)
Facility: HOSPITAL | Age: 63
LOS: 2 days | Discharge: HOME | DRG: 176 | End: 2018-10-24
Attending: EMERGENCY MEDICINE | Admitting: FAMILY MEDICINE
Payer: COMMERCIAL

## 2018-10-22 ENCOUNTER — APPOINTMENT (INPATIENT)
Dept: ULTRASOUND IMAGING | Facility: HOSPITAL | Age: 63
DRG: 176 | End: 2018-10-22
Payer: COMMERCIAL

## 2018-10-22 DIAGNOSIS — R06.00 DYSPNEA: ICD-10-CM

## 2018-10-22 DIAGNOSIS — I26.99 PULMONARY EMBOLI (HCC): Primary | ICD-10-CM

## 2018-10-22 DIAGNOSIS — I10 UNCONTROLLED HYPERTENSION: ICD-10-CM

## 2018-10-22 PROBLEM — E87.5 HYPERKALEMIA: Status: ACTIVE | Noted: 2018-10-22

## 2018-10-22 PROBLEM — I16.9 HYPERTENSIVE CRISIS: Status: ACTIVE | Noted: 2018-10-22

## 2018-10-22 PROBLEM — S62.101A WRIST FRACTURE, CLOSED, RIGHT, INITIAL ENCOUNTER: Status: ACTIVE | Noted: 2018-10-22

## 2018-10-22 LAB
ALBUMIN SERPL BCP-MCNC: 3.5 G/DL (ref 3.5–5)
ALP SERPL-CCNC: 139 U/L (ref 46–116)
ALT SERPL W P-5'-P-CCNC: 24 U/L (ref 12–78)
ANION GAP SERPL CALCULATED.3IONS-SCNC: 10 MMOL/L (ref 4–13)
ANION GAP SERPL CALCULATED.3IONS-SCNC: 11 MMOL/L (ref 4–13)
APTT PPP: 27 SECONDS (ref 24–36)
APTT PPP: >210 SECONDS (ref 24–36)
AST SERPL W P-5'-P-CCNC: 17 U/L (ref 5–45)
ATRIAL RATE: 93 BPM
BACTERIA UR QL AUTO: ABNORMAL /HPF
BASOPHILS # BLD AUTO: 0.11 THOUSANDS/ΜL (ref 0–0.1)
BASOPHILS NFR BLD AUTO: 1 % (ref 0–1)
BILIRUB DIRECT SERPL-MCNC: 0.13 MG/DL (ref 0–0.2)
BILIRUB SERPL-MCNC: 0.3 MG/DL (ref 0.2–1)
BILIRUB UR QL STRIP: NEGATIVE
BUN SERPL-MCNC: 22 MG/DL (ref 5–25)
BUN SERPL-MCNC: 23 MG/DL (ref 5–25)
CALCIUM SERPL-MCNC: 9.2 MG/DL (ref 8.3–10.1)
CALCIUM SERPL-MCNC: 9.3 MG/DL (ref 8.3–10.1)
CHLORIDE SERPL-SCNC: 105 MMOL/L (ref 100–108)
CHLORIDE SERPL-SCNC: 106 MMOL/L (ref 100–108)
CLARITY UR: CLEAR
CO2 SERPL-SCNC: 24 MMOL/L (ref 21–32)
CO2 SERPL-SCNC: 24 MMOL/L (ref 21–32)
COLOR UR: YELLOW
CREAT SERPL-MCNC: 1.11 MG/DL (ref 0.6–1.3)
CREAT SERPL-MCNC: 1.15 MG/DL (ref 0.6–1.3)
DEPRECATED D DIMER PPP: ABNORMAL NG/ML (FEU) (ref 0–424)
EOSINOPHIL # BLD AUTO: 0.23 THOUSAND/ΜL (ref 0–0.61)
EOSINOPHIL NFR BLD AUTO: 3 % (ref 0–6)
ERYTHROCYTE [DISTWIDTH] IN BLOOD BY AUTOMATED COUNT: 12.9 % (ref 11.6–15.1)
GFR SERPL CREATININE-BSD FRML MDRD: 51 ML/MIN/1.73SQ M
GFR SERPL CREATININE-BSD FRML MDRD: 54 ML/MIN/1.73SQ M
GLUCOSE SERPL-MCNC: 168 MG/DL (ref 65–140)
GLUCOSE SERPL-MCNC: 195 MG/DL (ref 65–140)
GLUCOSE UR STRIP-MCNC: ABNORMAL MG/DL
HCT VFR BLD AUTO: 40.7 % (ref 34.8–46.1)
HGB BLD-MCNC: 13.7 G/DL (ref 11.5–15.4)
HGB UR QL STRIP.AUTO: NEGATIVE
IMM GRANULOCYTES # BLD AUTO: 0.04 THOUSAND/UL (ref 0–0.2)
IMM GRANULOCYTES NFR BLD AUTO: 1 % (ref 0–2)
INR PPP: 1.02 (ref 0.86–1.17)
KETONES UR STRIP-MCNC: NEGATIVE MG/DL
LACTATE SERPL-SCNC: 1.2 MMOL/L (ref 0.5–2)
LEUKOCYTE ESTERASE UR QL STRIP: NEGATIVE
LYMPHOCYTES # BLD AUTO: 1.41 THOUSANDS/ΜL (ref 0.6–4.47)
LYMPHOCYTES NFR BLD AUTO: 17 % (ref 14–44)
MAGNESIUM SERPL-MCNC: 2 MG/DL (ref 1.6–2.6)
MCH RBC QN AUTO: 30.9 PG (ref 26.8–34.3)
MCHC RBC AUTO-ENTMCNC: 33.7 G/DL (ref 31.4–37.4)
MCV RBC AUTO: 92 FL (ref 82–98)
MONOCYTES # BLD AUTO: 0.82 THOUSAND/ΜL (ref 0.17–1.22)
MONOCYTES NFR BLD AUTO: 10 % (ref 4–12)
NEUTROPHILS # BLD AUTO: 5.71 THOUSANDS/ΜL (ref 1.85–7.62)
NEUTS SEG NFR BLD AUTO: 68 % (ref 43–75)
NITRITE UR QL STRIP: NEGATIVE
NON-SQ EPI CELLS URNS QL MICRO: ABNORMAL /HPF
NRBC BLD AUTO-RTO: 0 /100 WBCS
NT-PROBNP SERPL-MCNC: 194 PG/ML
P AXIS: 65 DEGREES
PH UR STRIP.AUTO: 7 [PH] (ref 4.5–8)
PHOSPHATE SERPL-MCNC: 3.1 MG/DL (ref 2.3–4.1)
PLATELET # BLD AUTO: 273 THOUSANDS/UL (ref 149–390)
PMV BLD AUTO: 10.3 FL (ref 8.9–12.7)
POTASSIUM SERPL-SCNC: 5.3 MMOL/L (ref 3.5–5.3)
POTASSIUM SERPL-SCNC: 5.7 MMOL/L (ref 3.5–5.3)
PR INTERVAL: 148 MS
PROT SERPL-MCNC: 7.7 G/DL (ref 6.4–8.2)
PROT UR STRIP-MCNC: ABNORMAL MG/DL
PROTHROMBIN TIME: 13.3 SECONDS (ref 11.8–14.2)
QRS AXIS: 57 DEGREES
QRSD INTERVAL: 72 MS
QT INTERVAL: 312 MS
QTC INTERVAL: 387 MS
RBC # BLD AUTO: 4.44 MILLION/UL (ref 3.81–5.12)
RBC #/AREA URNS AUTO: ABNORMAL /HPF
SODIUM SERPL-SCNC: 139 MMOL/L (ref 136–145)
SODIUM SERPL-SCNC: 141 MMOL/L (ref 136–145)
SP GR UR STRIP.AUTO: 1.01 (ref 1–1.03)
T WAVE AXIS: 67 DEGREES
TROPONIN I SERPL-MCNC: <0.02 NG/ML
TSH SERPL DL<=0.05 MIU/L-ACNC: 3.43 UIU/ML (ref 0.36–3.74)
UROBILINOGEN UR QL STRIP.AUTO: 0.2 E.U./DL
VENTRICULAR RATE: 93 BPM
WBC # BLD AUTO: 8.32 THOUSAND/UL (ref 4.31–10.16)
WBC #/AREA URNS AUTO: ABNORMAL /HPF

## 2018-10-22 PROCEDURE — 96376 TX/PRO/DX INJ SAME DRUG ADON: CPT

## 2018-10-22 PROCEDURE — 85379 FIBRIN DEGRADATION QUANT: CPT | Performed by: EMERGENCY MEDICINE

## 2018-10-22 PROCEDURE — 94760 N-INVAS EAR/PLS OXIMETRY 1: CPT

## 2018-10-22 PROCEDURE — 93970 EXTREMITY STUDY: CPT

## 2018-10-22 PROCEDURE — 96375 TX/PRO/DX INJ NEW DRUG ADDON: CPT

## 2018-10-22 PROCEDURE — 93010 ELECTROCARDIOGRAM REPORT: CPT | Performed by: INTERNAL MEDICINE

## 2018-10-22 PROCEDURE — 94664 DEMO&/EVAL PT USE INHALER: CPT

## 2018-10-22 PROCEDURE — 85305 CLOT INHIBIT PROT S TOTAL: CPT | Performed by: FAMILY MEDICINE

## 2018-10-22 PROCEDURE — 80048 BASIC METABOLIC PNL TOTAL CA: CPT | Performed by: FAMILY MEDICINE

## 2018-10-22 PROCEDURE — 85306 CLOT INHIBIT PROT S FREE: CPT | Performed by: FAMILY MEDICINE

## 2018-10-22 PROCEDURE — 84484 ASSAY OF TROPONIN QUANT: CPT | Performed by: EMERGENCY MEDICINE

## 2018-10-22 PROCEDURE — 99285 EMERGENCY DEPT VISIT HI MDM: CPT

## 2018-10-22 PROCEDURE — 85025 COMPLETE CBC W/AUTO DIFF WBC: CPT | Performed by: EMERGENCY MEDICINE

## 2018-10-22 PROCEDURE — 87040 BLOOD CULTURE FOR BACTERIA: CPT | Performed by: EMERGENCY MEDICINE

## 2018-10-22 PROCEDURE — 71046 X-RAY EXAM CHEST 2 VIEWS: CPT

## 2018-10-22 PROCEDURE — 85303 CLOT INHIBIT PROT C ACTIVITY: CPT | Performed by: FAMILY MEDICINE

## 2018-10-22 PROCEDURE — 83880 ASSAY OF NATRIURETIC PEPTIDE: CPT | Performed by: EMERGENCY MEDICINE

## 2018-10-22 PROCEDURE — 86146 BETA-2 GLYCOPROTEIN ANTIBODY: CPT | Performed by: FAMILY MEDICINE

## 2018-10-22 PROCEDURE — B24BZZZ ULTRASONOGRAPHY OF HEART WITH AORTA: ICD-10-PCS | Performed by: INTERNAL MEDICINE

## 2018-10-22 PROCEDURE — 85730 THROMBOPLASTIN TIME PARTIAL: CPT | Performed by: FAMILY MEDICINE

## 2018-10-22 PROCEDURE — 80048 BASIC METABOLIC PNL TOTAL CA: CPT | Performed by: EMERGENCY MEDICINE

## 2018-10-22 PROCEDURE — 86147 CARDIOLIPIN ANTIBODY EA IG: CPT | Performed by: FAMILY MEDICINE

## 2018-10-22 PROCEDURE — 85732 THROMBOPLASTIN TIME PARTIAL: CPT | Performed by: FAMILY MEDICINE

## 2018-10-22 PROCEDURE — 93306 TTE W/DOPPLER COMPLETE: CPT

## 2018-10-22 PROCEDURE — 84443 ASSAY THYROID STIM HORMONE: CPT | Performed by: EMERGENCY MEDICINE

## 2018-10-22 PROCEDURE — 80076 HEPATIC FUNCTION PANEL: CPT | Performed by: EMERGENCY MEDICINE

## 2018-10-22 PROCEDURE — 85613 RUSSELL VIPER VENOM DILUTED: CPT | Performed by: FAMILY MEDICINE

## 2018-10-22 PROCEDURE — 93970 EXTREMITY STUDY: CPT | Performed by: SURGERY

## 2018-10-22 PROCEDURE — 71275 CT ANGIOGRAPHY CHEST: CPT

## 2018-10-22 PROCEDURE — 81001 URINALYSIS AUTO W/SCOPE: CPT | Performed by: EMERGENCY MEDICINE

## 2018-10-22 PROCEDURE — 85610 PROTHROMBIN TIME: CPT | Performed by: EMERGENCY MEDICINE

## 2018-10-22 PROCEDURE — 96374 THER/PROPH/DIAG INJ IV PUSH: CPT

## 2018-10-22 PROCEDURE — 93306 TTE W/DOPPLER COMPLETE: CPT | Performed by: INTERNAL MEDICINE

## 2018-10-22 PROCEDURE — 93005 ELECTROCARDIOGRAM TRACING: CPT

## 2018-10-22 PROCEDURE — 85730 THROMBOPLASTIN TIME PARTIAL: CPT | Performed by: EMERGENCY MEDICINE

## 2018-10-22 PROCEDURE — 83735 ASSAY OF MAGNESIUM: CPT | Performed by: EMERGENCY MEDICINE

## 2018-10-22 PROCEDURE — 85670 THROMBIN TIME PLASMA: CPT | Performed by: FAMILY MEDICINE

## 2018-10-22 PROCEDURE — 99254 IP/OBS CNSLTJ NEW/EST MOD 60: CPT | Performed by: INTERNAL MEDICINE

## 2018-10-22 PROCEDURE — 85300 ANTITHROMBIN III ACTIVITY: CPT | Performed by: FAMILY MEDICINE

## 2018-10-22 PROCEDURE — 81241 F5 GENE: CPT | Performed by: FAMILY MEDICINE

## 2018-10-22 PROCEDURE — 81240 F2 GENE: CPT | Performed by: FAMILY MEDICINE

## 2018-10-22 PROCEDURE — 85705 THROMBOPLASTIN INHIBITION: CPT | Performed by: FAMILY MEDICINE

## 2018-10-22 PROCEDURE — 83605 ASSAY OF LACTIC ACID: CPT | Performed by: EMERGENCY MEDICINE

## 2018-10-22 PROCEDURE — 84100 ASSAY OF PHOSPHORUS: CPT | Performed by: EMERGENCY MEDICINE

## 2018-10-22 PROCEDURE — 36415 COLL VENOUS BLD VENIPUNCTURE: CPT | Performed by: EMERGENCY MEDICINE

## 2018-10-22 PROCEDURE — 99223 1ST HOSP IP/OBS HIGH 75: CPT | Performed by: FAMILY MEDICINE

## 2018-10-22 RX ORDER — NITROGLYCERIN 0.4 MG/1
0.4 TABLET SUBLINGUAL
Status: DISCONTINUED | OUTPATIENT
Start: 2018-10-22 | End: 2018-10-24 | Stop reason: HOSPADM

## 2018-10-22 RX ORDER — SODIUM POLYSTYRENE SULFONATE 15 G/60ML
15 SUSPENSION ORAL; RECTAL ONCE
Status: COMPLETED | OUTPATIENT
Start: 2018-10-22 | End: 2018-10-22

## 2018-10-22 RX ORDER — NIACIN 500 MG/1
500 TABLET, EXTENDED RELEASE ORAL DAILY
Status: DISCONTINUED | OUTPATIENT
Start: 2018-10-23 | End: 2018-10-24 | Stop reason: HOSPADM

## 2018-10-22 RX ORDER — HEPARIN SODIUM 10000 [USP'U]/100ML
3-30 INJECTION, SOLUTION INTRAVENOUS
Status: DISCONTINUED | OUTPATIENT
Start: 2018-10-22 | End: 2018-10-23

## 2018-10-22 RX ORDER — ASPIRIN 81 MG/1
81 TABLET, CHEWABLE ORAL DAILY
Status: DISCONTINUED | OUTPATIENT
Start: 2018-10-23 | End: 2018-10-24 | Stop reason: HOSPADM

## 2018-10-22 RX ORDER — ONDANSETRON 2 MG/ML
4 INJECTION INTRAMUSCULAR; INTRAVENOUS ONCE
Status: COMPLETED | OUTPATIENT
Start: 2018-10-22 | End: 2018-10-22

## 2018-10-22 RX ORDER — PRAVASTATIN SODIUM 40 MG
40 TABLET ORAL
Status: DISCONTINUED | OUTPATIENT
Start: 2018-10-22 | End: 2018-10-24 | Stop reason: HOSPADM

## 2018-10-22 RX ORDER — LABETALOL HYDROCHLORIDE 5 MG/ML
10 INJECTION, SOLUTION INTRAVENOUS ONCE
Status: COMPLETED | OUTPATIENT
Start: 2018-10-22 | End: 2018-10-22

## 2018-10-22 RX ORDER — CLOPIDOGREL BISULFATE 75 MG/1
75 TABLET ORAL DAILY
Status: DISCONTINUED | OUTPATIENT
Start: 2018-10-23 | End: 2018-10-24 | Stop reason: HOSPADM

## 2018-10-22 RX ORDER — HEPARIN SODIUM 1000 [USP'U]/ML
6800 INJECTION, SOLUTION INTRAVENOUS; SUBCUTANEOUS ONCE
Status: COMPLETED | OUTPATIENT
Start: 2018-10-22 | End: 2018-10-22

## 2018-10-22 RX ORDER — LABETALOL HYDROCHLORIDE 5 MG/ML
10 INJECTION, SOLUTION INTRAVENOUS ONCE
Status: DISCONTINUED | OUTPATIENT
Start: 2018-10-22 | End: 2018-10-22

## 2018-10-22 RX ADMIN — HEPARIN SODIUM 6800 UNITS: 1000 INJECTION, SOLUTION INTRAVENOUS; SUBCUTANEOUS at 14:20

## 2018-10-22 RX ADMIN — ONDANSETRON 4 MG: 2 INJECTION INTRAMUSCULAR; INTRAVENOUS at 12:18

## 2018-10-22 RX ADMIN — HEPARIN SODIUM AND DEXTROSE 18 UNITS/KG/HR: 10000; 5 INJECTION INTRAVENOUS at 14:21

## 2018-10-22 RX ADMIN — SODIUM POLYSTYRENE SULFONATE 15 G: 15 SUSPENSION ORAL; RECTAL at 21:44

## 2018-10-22 RX ADMIN — LABETALOL 20 MG/4 ML (5 MG/ML) INTRAVENOUS SYRINGE 10 MG: at 10:39

## 2018-10-22 RX ADMIN — LABETALOL 20 MG/4 ML (5 MG/ML) INTRAVENOUS SYRINGE 10 MG: at 12:04

## 2018-10-22 RX ADMIN — PRAVASTATIN SODIUM 40 MG: 40 TABLET ORAL at 18:17

## 2018-10-22 RX ADMIN — METOPROLOL TARTRATE 25 MG: 25 TABLET ORAL at 21:44

## 2018-10-22 RX ADMIN — IOHEXOL 85 ML: 350 INJECTION, SOLUTION INTRAVENOUS at 12:55

## 2018-10-22 NOTE — CONSULTS
Consultation - Pulmonary Medicine   Lita Olson 66 y o  female MRN: [id number]  Unit/Bed#: -01 Encounter: [id number]      Assessment:  1  Acute PE  2  H/o DVT    Plan:   CTA shows multilobar pulmonary emboli including distal right pulmonary artery, no saddle embolus  She is not hypoxic, not tachycardic, has been hemodynamically stable  Echo has been ordered to rule out any right heart strain  Lower extremity Dopplers are ordered, will also order a right upper extremity Doppler given recent arm fracture though unlikely to lead to a PE  Unclear etiology, she did have a recent right wrist fracture though has not been more sedentary  She did travel this summer in a car along the [place] though she completed that in August   No recent surgery or period of immobility  She does have a history of a DVT in 2012 in the left peroneal vein after having a cast on her leg for fracture  She was not treated with any anticoagulation at that time  She does also report blood clots in her mother later in life in her [de-identified]  Given this is her 2nd DVT without a clear provoking factor this time, she may require lifelong anticoagulation  Would have her follow with Hematology as an outpatient for their recommendation  She is currently on heparin, can transition to Eliquis or Xarelto based on insurance coverage  Can follow-up with us in the office in 1-2 weeks  History of Present Illness   Physician Requesting Consult: Mateus Gore MD  Reason for Consult / Principal Problem:  PE  Hx and PE limited by:  None  HPI: Lita Olson is a 66y o  year old female former smoker with past medical history of CAD, DM, HTN, DVT in 2012 who presents with complaint of shortness of Breath which started about 2 days ago and became progressively worse this morning  She tried to walk down the [place] to the nurse's office and became lightheaded, felt as though she was going to pass out  She denies any chest pain    She did fracture her wrist about 5 weeks ago, though has not been more sedentary  She did travel along the  [address] in a car over the summer but returned home in August   No recent surgery  She is up-to-date with her age appropriate screenings  She did have a DVT in the left leg in  after a fracture when the cast was removed  Inpatient consult to Pulmonology  Consult performed by: Matthew Alan ordered by: Pricilla Joseph          Review of Systems   Constitutional: Negative  HENT: Negative  Respiratory: Positive for shortness of breath  Cardiovascular: Negative  Gastrointestinal: Negative  Genitourinary: Negative  Musculoskeletal: Negative  Skin: Negative  Allergic/Immunologic: Negative  Neurological: Positive for light-headedness  Psychiatric/Behavioral: Negative  Historical Information   Past Medical History:   Diagnosis Date    Acute myocardial infarction [facility])     Last Assessed: 2012    Carotid artery occlusion     Coronary artery disease     Diabetes mellitus ([facility] [address] )     Ectopic pregnancy     Onset:     Hyperlipidemia     Hypertension      Past Surgical History:   Procedure Laterality Date     SECTION      Onset:     CLOSED REDUCTION METATARSAL FRACTURE Left     5th toe; Onset: 2012    CORONARY ANGIOPLASTY      CORONARY ANGIOPLASTY WITH STENT PLACEMENT      Onset:     EYE SURGERY Bilateral     cateract surg    TUBAL LIGATION      Onset:      Social History   History   Alcohol Use    Yes     Comment: social     History   Drug Use No     History   Smoking Status    Former Smoker   Smokeless Tobacco    Never Used     Comment: Former smoker per [employer]     Occupational History: Teacher    Family History:  Mother with history of blood clots later in life    Meds/Allergies   all current active meds have been reviewed, pertinent pulmonary meds have been reviewed and current meds:   Current Facility-Administered Medications   Medication Dose Route Frequency    [START ON 10/23/2018] aspirin chewable tablet 81 mg  81 mg Oral Daily    [START ON 10/23/2018] clopidogrel (PLAVIX) tablet 75 mg  75 mg Oral Daily    heparin (porcine) 25,000 units in 250 mL infusion (premix)  3-30 Units/kg/hr (Order-Specific) Intravenous Titrated    insulin lispro (HumaLOG) 100 units/mL subcutaneous injection 1 Units  1 Units Subcutaneous Continuous    metoprolol tartrate (LOPRESSOR) tablet 25 mg  25 mg Oral Q12H [address]    [START ON 10/23/2018] niacin (NIASPAN) CR tablet 500 mg  500 mg Oral Daily    nitroglycerin (NITROSTAT) SL tablet 0 4 mg  0 4 mg Sublingual Q5 Min PRN    pravastatin (PRAVACHOL) tablet 40 mg  40 mg Oral Daily With Dinner    sodium polystyrene sulfonate (KAYEXALATE) oral suspension 15 g  15 g Oral Once       Allergies   Allergen Reactions    Codeine Nausea Only       Objective   Vitals: Blood pressure 157/68, pulse 92, temperature 97 8 °F (36 6 °C), temperature source Oral, resp  rate 19, height 5' 3" (1 6 m), weight 86 2 kg (190 lb 0 6 oz), SpO2 98 %  ,Body mass index is 33 66 kg/m²  No intake or output data in the 24 hours ending 10/22/18 1645  Invasive Devices     Peripheral Intravenous Line            Peripheral IV 10/22/18 Right Antecubital less than 1 day                Physical Exam   Constitutional: She is oriented to person, place, and time  She appears well-developed and well-nourished  No distress  HENT:   Mouth/Throat: Oropharynx is clear and moist    Eyes: Pupils are equal, round, and reactive to light  Cardiovascular: Normal rate and normal heart sounds  Pulmonary/Chest: Effort normal and breath sounds normal  No respiratory distress  She has no decreased breath sounds  She has no wheezes  She has no rhonchi  She has no rales  Abdominal: Soft  There is no tenderness  Musculoskeletal: Normal range of motion  She exhibits no edema  Neurological: She is alert and oriented to person, place, and time  Skin: Skin is warm and dry  Psychiatric: She has a normal mood and affect  Lab Results:   I have personally reviewed pertinent lab results  , CBC:   Lab Results   Component Value Date    WBC 8 32 10/22/2018    HGB 13 7 10/22/2018    HCT 40 7 10/22/2018    MCV 92 10/22/2018     10/22/2018    MCH 30 9 10/22/2018    MCHC 33 7 10/22/2018    RDW 12 9 10/22/2018    MPV 10 3 10/22/2018    NRBC 0 10/22/2018   , CMP:   Lab Results   Component Value Date     10/22/2018    K 5 7 (H) 10/22/2018     10/22/2018    CO2 24 10/22/2018    BUN 23 10/22/2018    CREATININE 1 11 10/22/2018    CALCIUM 9 3 10/22/2018    AST 17 10/22/2018    ALT 24 10/22/2018    ALKPHOS 139 (H) 10/22/2018    EGFR 54 10/22/2018     Imaging Studies: I have personally reviewed pertinent reports  and I have personally reviewed pertinent films in PACS  EKG, Pathology, and Other Studies: I have personally reviewed pertinent reports      VTE Prophylaxis: Heparin    Code Status: No Order  Advance Directive and Living Will:      Power of :    POLST:

## 2018-10-22 NOTE — ASSESSMENT & PLAN NOTE
No results found for: HGBA1C    No results for input(s): POCGLU in the last 72 hours  Blood Sugar Average: Last 72 hrs: Will get hemoglobin A1c

## 2018-10-22 NOTE — ED PROVIDER NOTES
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 and then went down to the nurse and she felt an aura of something closing in on her or while she was walking and got progressively short of breath  The patient had taken and score of nitro which she states did not make much of a difference   also reports that patient took was scored of nitro last evening as well  Patient states she had an MI in the past and this is the 1st time she has used her nitro spray  Patient denies any yellow or green phlegm production  Patient has multiple risk factors for PE  Patient has a recent fracture of her right forearm, had cataract surgery several months ago, and traveled in a car 6 weeks all over [place]  Patient also has a history of a DVT in her right lower extremity  Prior to Admission Medications   Prescriptions Last Dose Informant Patient Reported? Taking?    DIANE CONTOUR NEXT TEST test strip  Self Yes Yes   Glucose Blood (IGLUCOSE TEST STRIPS VI)  Self Yes Yes   Sig: Test 5 times daily, e10 9, pump therapy   Insulin Syringe-Needle U-100 (INSULIN SYRINGE 1CC/31GX5/16") 31G X 5/16" 1 ML MISC  Self Yes Yes   Sig: Inject 2 times per day   aspirin 81 MG tablet  Self Yes Yes   Sig: Take 1 tablet by mouth daily   clopidogrel (PLAVIX) 75 mg tablet   No Yes   Sig: TAKE 1 TABLET DAILY   insulin lispro (HUMALOG) 100 units/mL injection  Self Yes Yes   Sig: Inject under the skin     lisinopril (ZESTRIL) 10 mg tablet   No Yes   Sig: TAKE 1 TABLET DAILY   metoprolol tartrate (LOPRESSOR) 25 mg tablet   No Yes   Sig: TAKE 1 TABLET TWICE A DAY   niacin (NIASPAN) 500 mg CR tablet  Self Yes Yes   Sig: Take 1 tablet by mouth daily   nitroglycerin (NITROLINGUAL) 0 4 mg/spray spray  Self Yes Yes   Sig: Nitroglycerin 400 MCG/SPRAY Translingual Aerosol Solution; PRN CHEST PAIN; 90; 3; 2016; 2016; Luna Muck; Active   simvastatin (ZOCOR) 40 mg tablet  Self No Yes   Sig: TAKE 1 TABLET DAILY      Facility-Administered Medications: None       Past Medical History:   Diagnosis Date    Acute myocardial infarction [facility])     Last Assessed: 2012    Carotid artery occlusion     Coronary artery disease     Diabetes mellitus ([facility] [address] )     Ectopic pregnancy     Onset:     Hyperlipidemia     Hypertension        Past Surgical History:   Procedure Laterality Date     SECTION      Onset:     CLOSED REDUCTION METATARSAL FRACTURE Left     5th toe; Onset: 2012    CORONARY ANGIOPLASTY      CORONARY ANGIOPLASTY WITH STENT PLACEMENT      Onset:     EYE SURGERY Bilateral     cateract surg    TUBAL LIGATION      Onset:        Family History   Problem Relation Age of Onset    Arrhythmia Father         Myocardial Infarction Arrhythmias    Prostate cancer Father     Other Family         Coronary Arterial Triple Bypass Graft     I have reviewed and agree with the history as documented  Social History   Substance Use Topics    Smoking status: Former Smoker    Smokeless tobacco: Never Used      Comment: Former smoker per [employer]    Alcohol use Yes      Comment: social        Review of Systems   Constitutional: Positive for activity change and fatigue  Negative for chills and fever  HENT: Negative for congestion and rhinorrhea  Eyes: Negative for discharge and visual disturbance  Respiratory: Positive for shortness of breath  Negative for wheezing  Cardiovascular: Negative for chest pain and palpitations  Gastrointestinal: Negative for abdominal pain and vomiting  Endocrine: Negative for polydipsia and polyuria  Genitourinary: Negative for dysuria and hematuria  Musculoskeletal: Negative for arthralgias, gait problem and neck stiffness  Skin: Negative for rash and wound  Neurological: Negative for dizziness and headaches  Psychiatric/Behavioral: Negative for confusion and suicidal ideas  Physical Exam  Physical Exam   Constitutional: She is oriented to person, place, and time  She appears well-developed and well-nourished  53-year-old white female who is hypertensive and lying on the stretcher complaining of some shortness of breath  HENT:   Head: Normocephalic and atraumatic  Mouth/Throat: Oropharynx is clear and moist    Eyes: Pupils are equal, round, and reactive to light  EOM are normal    Neck: Normal range of motion  Neck supple  Cardiovascular: Normal rate, regular rhythm and normal heart sounds  Pulmonary/Chest: Effort normal    Slight rales at the bases   Abdominal: Soft  Bowel sounds are normal  There is no tenderness  There is no rebound and no guarding  Musculoskeletal: Normal range of motion  Patient has a right forearm velcro splint in place secondary to a fracture approximately 6 weeks ago  There is no calf tenderness  No evidence of a Homans sign   Neurological: She is alert and oriented to person, place, and time  No cranial nerve deficit  She exhibits normal muscle tone  Coordination normal    Skin: Skin is warm and dry  Psychiatric: She has a normal mood and affect  Nursing note and vitals reviewed        Vital Signs  ED Triage Vitals   Temperature Pulse Respirations Blood Pressure SpO2   10/22/18 1011 10/22/18 1003 10/22/18 1003 10/22/18 1003 10/22/18 1003   98 °F (36 7 °C) 91 14 (!) 248/110 96 %      Temp Source Heart Rate Source Patient Position - Orthostatic VS BP Location FiO2 (%)   10/22/18 1011 10/22/18 1003 10/22/18 1003 10/22/18 1003 --   Oral Monitor Lying Left arm       Pain Score       10/22/18 1003       No Pain           Vitals:    10/22/18 1003 10/22/18 1030 10/22/18 1100   BP: (!) 248/110 (!) 223/96 (!) 185/79   Pulse: 91 90 91   Patient Position - Orthostatic VS: Lying         Visual Acuity      ED Medications  Medications   heparin (porcine) 25,000 units in 250 mL infusion (premix) (18 Units/kg/hr × 85 kg (Order-Specific) Intravenous New Bag 10/22/18 1421)   labetalol (NORMODYNE) injection 10 mg (10 mg Intravenous Given 10/22/18 1039)   labetalol (NORMODYNE) injection 10 mg (10 mg Intravenous Given 10/22/18 1204)   ondansetron (ZOFRAN) injection 4 mg (4 mg Intravenous Given 10/22/18 1218)   iohexol (OMNIPAQUE) 350 MG/ML injection (MULTI-DOSE) 85 mL (85 mL Intravenous Given 10/22/18 1255)   heparin (porcine) injection 6,800 Units (6,800 Units Intravenous Given 10/22/18 1420)       Diagnostic Studies  Results Reviewed     Procedure Component Value Units Date/Time    Lupus anticoagulant [[id number]] Updated:  10/22/18 1445    Lab Status:  No result Specimen:  Blood from Arm, Right     Factor 5 leiden [[id number]] Collected:  10/22/18 1423    Lab Status: In process Specimen:  Blood from Arm, Right Updated:  10/22/18 1429    Beta-2 glycoprotein antibodies [[id number]] Collected:  10/22/18 1423    Lab Status: In process Specimen:  Blood from Arm, Right Updated:  10/22/18 1429    Prothrombin gene mutation [[id number]] Collected:  10/22/18 1423    Lab Status: In process Specimen:  Blood from Arm, Right Updated:  10/22/18 1429    Cardiolipin antibody [[id number]] Collected:  10/22/18 1423    Lab Status: In process Specimen:  Blood from Arm, Right Updated:  10/22/18 1429    Protein S activity [[id number]] Collected:  10/22/18 1423    Lab Status: In process Specimen:  Blood from Arm, Right Updated:  10/22/18 1429    Antithrombin III Activity [[id number]] Collected:  10/22/18 1423    Lab Status:   In process Specimen:  Blood from Arm, Right Updated:  10/22/18 1422 Protein S Antigen, Total & Free [[id number]] Collected:  10/22/18 1424    Lab Status: In process Specimen:  Blood from Arm, Right Updated:  10/22/18 1429    Protein C activity [[id number]] Collected:  10/22/18 1423    Lab Status: In process Specimen:  Blood from Arm, Right Updated:  10/22/18 1429    Troponin I [[id number]]     Lab Status:  No result Specimen:  Blood     Troponin I [[id number]]  (Normal) Collected:  10/22/18 1340    Lab Status:  Final result Specimen:  Blood from Arm, Left Updated:  10/22/18 1406     Troponin I <0 02 ng/mL     Urine Microscopic [[id number]]  (Abnormal) Collected:  10/22/18 1212    Lab Status:  Final result Specimen:  Urine from Urine, Clean Catch Updated:  10/22/18 1230     RBC, UA None Seen /hpf      WBC, UA 1-2 (A) /hpf      Epithelial Cells Occasional /hpf      Bacteria, UA None Seen /hpf     UA w Reflex to Microscopic w Reflex to Culture [[id number]]  (Abnormal) Collected:  10/22/18 1212    Lab Status:  Final result Specimen:  Urine from Urine, Clean Catch Updated:  10/22/18 1221     Color, UA Yellow     Clarity, UA Clear     Specific Gravity, UA 1 010     pH, UA 7 0     Leukocytes, UA Negative     Nitrite, UA Negative     Protein, UA 30 (1+) (A) mg/dl      Glucose,  (1/4%) (A) mg/dl      Ketones, UA Negative mg/dl      Urobilinogen, UA 0 2 E U /dl      Bilirubin, UA Negative     Blood, UA Negative    Blood culture #1 [[id number]] Collected:  10/22/18 1133    Lab Status:   In process Specimen:  Blood from Arm, Left Updated:  10/22/18 1138    D-Dimer [[id number]]  (Abnormal) Collected:  10/22/18 1044    Lab Status:  Final result Specimen:  Blood from Arm, Right Updated:  10/22/18 1127     D-Dimer, Quant >10,000 (H) ng/ml (FEU)     Hepatic function panel [[id number]]  (Abnormal) Collected:  10/22/18 1044    Lab Status:  Final result Specimen:  Blood from Arm, Right Updated:  10/22/18 1126     Total Bilirubin 0 30 mg/dL      Bilirubin, Direct 0 13 mg/dL      Alkaline Phosphatase 139 (H) U/L AST 17 U/L      ALT 24 U/L      Total Protein 7 7 g/dL      Albumin 3 5 g/dL     TSH [[id number]]  (Normal) Collected:  10/22/18 1044    Lab Status:  Final result Specimen:  Blood from Arm, Right Updated:  10/22/18 1126     TSH 3RD GENERATON 3 427 uIU/mL     Narrative:         Patients undergoing fluorescein dye angiography may retain small amounts of fluorescein in the body for 48-72 hours post procedure  Samples containing fluorescein can produce falsely depressed TSH values  If the patient had this procedure,a specimen should be resubmitted post fluorescein clearance  The recommended reference ranges for TSH during pregnancy are as follows:  First trimester 0 1 to 2 5 uIU/mL  Second trimester  0 2 to 3 0 uIU/mL  Third trimester 0 3 to 3 0 uIU/m      B-type natriuretic peptide [[id number]]  (Abnormal) Collected:  10/22/18 1044    Lab Status:  Final result Specimen:  Blood from Arm, Right Updated:  10/22/18 1126     NT-proBNP 194 (H) pg/mL     Phosphorus [[id number]]  (Normal) Collected:  10/22/18 1044    Lab Status:  Final result Specimen:  Blood from Arm, Right Updated:  10/22/18 1126     Phosphorus 3 1 mg/dL     Magnesium [[id number]]  (Normal) Collected:  10/22/18 1044    Lab Status:  Final result Specimen:  Blood from Arm, Right Updated:  10/22/18 1126     Magnesium 2 0 mg/dL     Lactic acid, plasma [[id number]]  (Normal) Collected:  10/22/18 1044    Lab Status:  Final result Specimen:  Blood from Arm, Right Updated:  10/22/18 1121     LACTIC ACID 1 2 mmol/L     Narrative:         Result may be elevated if tourniquet was used during collection      Troponin I [[id number]]  (Normal) Collected:  10/22/18 1044    Lab Status:  Final result Specimen:  Blood from Arm, Right Updated:  10/22/18 1119     Troponin I <0 02 ng/mL     Basic metabolic panel [[id number]]  (Abnormal) Collected:  10/22/18 1044    Lab Status:  Final result Specimen:  Blood from Arm, Right Updated:  10/22/18 1118     Sodium 141 mmol/L Potassium 5 7 (H) mmol/L      Chloride 106 mmol/L      CO2 24 mmol/L      ANION GAP 11 mmol/L      BUN 23 mg/dL      Creatinine 1 11 mg/dL      Glucose 195 (H) mg/dL      Calcium 9 3 mg/dL      eGFR 54 ml/min/1 73sq m     Narrative:         National Kidney Disease Education Program recommendations are as follows:  GFR calculation is accurate only with a steady state creatinine  Chronic Kidney disease less than 60 ml/min/1 73 sq  meters  Kidney failure less than 15 ml/min/1 73 sq  meters  APTT [[id number]]  (Normal) Collected:  10/22/18 1044    Lab Status:  Final result Specimen:  Blood from Arm, Right Updated:  10/22/18 1116     PTT 27 seconds     Protime-INR [[id number]]  (Normal) Collected:  10/22/18 1044    Lab Status:  Final result Specimen:  Blood from Arm, Right Updated:  10/22/18 1115     Protime 13 3 seconds      INR 1 02    CBC and differential [[id number]]  (Abnormal) Collected:  10/22/18 1044    Lab Status:  Final result Specimen:  Blood from Arm, Right Updated:  10/22/18 1057     WBC 8 32 Thousand/uL      RBC 4 44 Million/uL      Hemoglobin 13 7 g/dL      Hematocrit 40 7 %      MCV 92 fL      MCH 30 9 pg      MCHC 33 7 g/dL      RDW 12 9 %      MPV 10 3 fL      Platelets 243 Thousands/uL      nRBC 0 /100 WBCs      Neutrophils Relative 68 %      Immat GRANS % 1 %      Lymphocytes Relative 17 %      Monocytes Relative 10 %      Eosinophils Relative 3 %      Basophils Relative 1 %      Neutrophils Absolute 5 71 Thousands/µL      Immature Grans Absolute 0 04 Thousand/uL      Lymphocytes Absolute 1 41 Thousands/µL      Monocytes Absolute 0 82 Thousand/µL      Eosinophils Absolute 0 23 Thousand/µL      Basophils Absolute 0 11 (H) Thousands/µL     Blood culture #2 [[id number]] Collected:  10/22/18 1044    Lab Status:   In process Specimen:  Blood from Arm, Right Updated:  10/22/18 1052                 CTA ED chest PE study   Final Result by Shawn Cervantes MD (10/22 1326)      Multi lobar pulmonary emboli including distal right pulmonary artery  No saddle embolus  Minimal nonspecific mid to distal esophageal wall thickening  While this may simply represent underdistention, consider esophagitis in the appropriate clinical context  Old calcified granulomatous disease  Patchy heterogeneous cortical hypoattenuation bilateral partially visualized kidneys may simply be artifact related to phase of contrast   Correlate with urinalysis to exclude pyelonephritis  I personally discussed pertinent acute findings on this study with Reny Lester on 10/22/2018 at 13:19  Workstation performed: [id number]         XR chest 2 views   Final Result by Kristofer Morrow DO (10/22 1141)      No acute cardiopulmonary disease  Workstation performed: [id number]                    Procedures  ECG 12 Lead Documentation  Date/Time: 10/22/2018 10:11 AM  Performed by: Mary Meeks  Authorized by: Mary Meeks     ECG reviewed by me, the ED Provider: yes    Patient location:  ED  Interpretation:     Interpretation: normal    Rate:     ECG rate assessment: normal    Rhythm:     Rhythm: sinus rhythm    ST segments:     ST segments:  Normal  Other findings:     Other findings: poor R wave progression             Phone Contacts  ED Phone Contact    ED Course  ED Course as of Oct 22 1449   Mon Oct 22, 2018   1314 CTA ED chest PE study        I re-evaluated patient several times  Patient still complained of shortness of breath  2 L of nasal cannula was ordered for the patient  I reviewed the labs and CT results with the patient  1:05 PM:  I spoke with Dr Venita Bradford we will admit the patient and start some high-dose heparin at this time  MDM  CritCare Time     Differential diagnosis includes:  1  Shortness of breath  2  Cardiac disease  3  Rule out PE  4  Rule out pneumonia  5  Rule out Congestive Heart Failure  6  Uncontrolled hypertension  7   Hypertensive urgency     Disposition  Final diagnoses:   Pulmonary emboli ([facility] [address] )   Dyspnea   Uncontrolled hypertension     Time reflects when diagnosis was documented in both MDM as applicable and the Disposition within this note     Time User Action Codes Description Comment    10/22/2018  1:31 PM Estella Funk Add [I26 99] Pulmonary emboli ([address] )     10/22/2018  1:31 PM Estella Funk Add [R06 00] Dyspnea     10/22/2018  2:49 PM Estella Funk Add [I10] Uncontrolled hypertension       ED Disposition     ED Disposition Condition Comment    Admit  Case was discussed with Dr Anand Cantor and the patient's admission status was agreed to be Admission Status: inpatient status to the service of Dr Anand Cantor   Follow-up Information    None         Patient's Medications   Discharge Prescriptions    No medications on file     No discharge procedures on file      ED Provider  Electronically Signed by           Leopold Net, DO  10/22/18 3501

## 2018-10-22 NOTE — ASSESSMENT & PLAN NOTE
Acute P E  Will get an echocardiogram   There is no evidence of RV strain on the CT scan  Will get a hypercoagulable panel  Patient did not get any anticoagulation yet  Will start on heparin drip  Will have pulmonology evaluate  Patient may be a good candidate for 1 of the newer anticoagulants  Will continue monitor closely

## 2018-10-22 NOTE — ASSESSMENT & PLAN NOTE
Will hold the ACE-inhibitor for now  Will check another potassium level later today give the patient some Kayexalate

## 2018-10-22 NOTE — RESPIRATORY THERAPY NOTE
RT Protocol Note  Ora Cheek 66 y o  female MRN: [id number]  Unit/Bed#: -18 Encounter: [id number]    Assessment    Principal Problem:    Pulmonary embolism ([facility] )  Active Problems:    Coronary artery disease involving native coronary artery of native heart without angina pectoris    Hyperlipidemia    Essential hypertension    Type 1 diabetes mellitus with sensory neuropathy (HCC)    Wrist fracture, closed, right, initial encounter    Hypertensive crisis    Hyperkalemia      Home Pulmonary Medications:  N/A       Past Medical History:   Diagnosis Date    Acute myocardial infarction [facility])     Last Assessed: 11/7/2012    Carotid artery occlusion     Coronary artery disease     Diabetes mellitus ([facility] )     Ectopic pregnancy     Onset: 1982    Hyperlipidemia     Hypertension      Social History     Social History    Marital status: /Civil Union     Spouse name: N/A    Number of children: N/A    Years of education: N/A     Social History Main Topics    Smoking status: Former Smoker    Smokeless tobacco: Never Used      Comment: Former smoker per Allscripts    Alcohol use Yes      Comment: social    Drug use: No    Sexual activity: Not Asked     Other Topics Concern    None     Social History Narrative    None       Subjective     Patient awake and alert without apparent respiratory distress     Objective    Physical Exam:   Assessment Type: Assess only  General Appearance: Alert, Awake  Respiratory Pattern: Normal, Spontaneous  Chest Assessment: Chest expansion symmetrical  Bilateral Breath Sounds: Clear, Diminished  Cough: None  O2 Device: room air    Vitals:  Blood pressure 157/68, pulse 89, temperature 97 8 °F (36 6 °C), temperature source Oral, resp  rate 16, height 5' 3" (1 6 m), weight 86 2 kg (190 lb 0 6 oz), SpO2 94 %  Imaging and other studies: I have personally reviewed pertinent reports        O2 Device: room air     Plan    Respiratory Plan: No distress/Pulmonary history        Resp Comments: respiratory protocol completed at this time pateint without apparent respiratory distress no indication for aerosol therapy needed patient states she is having no breathing issues  nothing further needed from respiratory care protocol discontinued

## 2018-10-22 NOTE — LETTER
[address] FLOOR MED SURG UNIT  [facility] [address]  Dept: [phone]    October 24, 2018     Patient: Augie Izquierdo   YOB: 1955   Date of Visit: 10/22/2018       To Whom it May Concern:    Kev Gonzales is under my professional care  She was seen in the hospital from 10/22/2018   to 10/24/18  She may return to work on Wednesday October 31st     If you have any questions or concerns, please don't hesitate to call           Sincerely,          Claudia Fournier, [facility]

## 2018-10-22 NOTE — H&P
H&P- Damian Velazco 1955, 66 y o  female MRN: [id number]    Unit/Bed#: ED 16 Encounter: [id number]    Primary Care Provider: Sunil Martinez PA-C   Date and time admitted to hospital: 10/22/2018  9:57 AM        Hyperkalemia   Assessment & Plan    Will hold the ACE-inhibitor for now  Will check another potassium level later today give the patient some Kayexalate  Hypertensive crisis   Assessment & Plan    This has improved  Will put p r n  Hydralazine continue the patient on home medication  Wrist fracture, closed, right, initial encounter   Assessment & Plan    Follow up with Orthopedics as an outpatient  This is non operative  Type 1 diabetes mellitus with sensory neuropathy (HCC)   Assessment & Plan    No results found for: HGBA1C    No results for input(s): POCGLU in the last 72 hours  Blood Sugar Average: Last 72 hrs: Will get hemoglobin A1c  Essential hypertension   Assessment & Plan    Continue beta-blocker     Coronary artery disease involving native coronary artery of native heart without angina pectoris   Assessment & Plan    Patient is on Plavix and beta-blocker  She does have a history of a stent  Will continue both  * Pulmonary embolism (HCC)   Assessment & Plan    Acute P E  Will get an echocardiogram   There is no evidence of RV strain on the CT scan  Will get a hypercoagulable panel  Patient did not get any anticoagulation yet  Will start on heparin drip  Will have pulmonology evaluate  Patient may be a good candidate for 1 of the newer anticoagulants  Will continue monitor closely  VTE Prophylaxis: Heparin Drip  / sequential compression device   Code Status:  Full code  POLST: POLST is not applicable to this patient  Discussion with family:   at bedside    Anticipated Length of Stay:  Patient will be admitted on an Inpatient basis with an anticipated length of stay of  greater than 2 midnights     Justification for Hospital Stay: Patient is going for secondary to having multiple pulmonary emboli hypoxia and a crisis in the heparin drip and further  Total Time for Visit, including Counseling / Coordination of Care: 45 minutes  Greater than 50% of this total time spent on direct patient counseling and coordination of care  Chief Complaint:   Shortness of breath    History of Present Illness:    Chantal Chun is a 66 y o  female who presents with shortness of breath  Patient states she was short of breath started yesterday and got progressively worse this morning  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 secondary to the shortness of breath    The patient was sent to the nurse's office and on her way there she also felt very lightheaded and dizzy like things were closing in on her  She thought she was going to pass out  When she got to the nurse's office to call 911  Patient denies any chest pain  She has had no other recent illnesses  She did have cataract surgery done  Patient also had fell and has a wrist fracture which was 5 weeks ago  When asked about any recent travel they did take a long car trip in the summer but came back at the end of August   She has no other risk factor she has no leg pain or leg swelling  She has no family history of any coagulation disorders  Patient does not have a history of a blood clot herself  Review of Systems:    Review of Systems   Constitutional: Positive for activity change and fatigue  Respiratory: Positive for shortness of breath  Neurological: Positive for dizziness, weakness and light-headedness  All other systems reviewed and are negative        Past Medical and Surgical History:     Past Medical History:   Diagnosis Date    Acute myocardial infarction [facility])     Last Assessed: 11/7/2012    Carotid artery occlusion     Coronary artery disease     [facility] ([facility] )     Ectopic pregnancy     Onset: 1982    Hyperlipidemia     Hypertension        Past Surgical History:   Procedure Laterality Date     SECTION      Onset:     CLOSED REDUCTION METATARSAL FRACTURE Left     5th toe; Onset: 2012    CORONARY ANGIOPLASTY      CORONARY ANGIOPLASTY WITH STENT PLACEMENT      Onset:     EYE SURGERY Bilateral     cateract surg    TUBAL LIGATION      Onset:        Meds/Allergies:    Prior to Admission medications    Medication Sig Start Date End Date Taking? Authorizing Provider   aspirin 81 MG tablet Take 1 tablet by mouth daily   Yes Historical Provider, MD   DIANE CONTOUR NEXT TEST test strip  18  Yes Historical Provider, MD   clopidogrel (PLAVIX) 75 mg tablet TAKE 1 TABLET DAILY 18  Yes Reena Nuñez MD   Glucose Blood (IGLUCOSE TEST STRIPS VI) Test 5 times daily, e10 9, pump therapy 17  Yes Historical Provider, MD   insulin lispro (HUMALOG) 100 units/mL injection Inject under the skin   10/21/11  Yes Historical Provider, MD   Insulin Syringe-Needle U-100 (INSULIN SYRINGE 1CC/31GX5/16") 31G X 5/16" 1 ML MISC Inject 2 times per day 16  Yes Historical Provider, MD   lisinopril (ZESTRIL) 10 mg tablet TAKE 1 TABLET DAILY 18  Yes Jenniffer Bernardo MD   metoprolol tartrate (LOPRESSOR) 25 mg tablet TAKE 1 TABLET TWICE A DAY 18  Yes Jenniffer Bernardo MD   niacin (NIASPAN) 500 mg CR tablet Take 1 tablet by mouth daily 18  Yes Historical Provider, MD   nitroglycerin (NITROLINGUAL) 0 4 mg/spray spray Nitroglycerin 400 MCG/SPRAY Translingual Aerosol Solution; PRN CHEST PAIN; 90; 3; 2016; 2016; Jocelin Frieda;  Active 16  Yes Historical Provider, MD   simvastatin (ZOCOR) 40 mg tablet TAKE 1 TABLET DAILY 18  Yes Jenniffer Bernardo MD   al mag oxide-diphenhydramine-lidocaine viscous (MAGIC MOUTHWASH) 1:1:1 suspension Swish and spit 10 mL every 4 (four) hours as needed for mouth pain or discomfort 6/5/18 10/22/18  Rosa Hughes MD   ofloxacin (OCUFLOX) 0 3 % ophthalmic solution  18 10/22/18  Historical Provider, MD MAYES have reviewed home medications with patient personally  Allergies:    Allergies   Allergen Reactions    Codeine Nausea Only       Social History:     Marital Status: /Civil Union   Occupation:  Teacher  Patient Pre-hospital Living Situation:  Lives with her   Patient Pre-hospital Level of Mobility:  Independent  Patient Pre-hospital Diet Restrictions:  None  Substance Use History:   History   Alcohol Use    Yes     Comment: social     History   Smoking Status    Former Smoker   Smokeless Tobacco    Never Used     Comment: Former smoker per Allscripts     History   Drug Use No       Family History:    Family History   Problem Relation Age of Onset    Arrhythmia Father         Myocardial Infarction Arrhythmias    Prostate cancer Father     Other Family         Coronary Arterial Triple Bypass Graft       Physical Exam:     Vitals:   Blood Pressure: (!) 185/79 (10/22/18 1100)  Pulse: 91 (10/22/18 1100)  Temperature: 98 °F (36 7 °C) (10/22/18 1011)  Temp Source: Oral (10/22/18 1011)  Respirations: 14 (10/22/18 1100)  Height: 5' 3" (160 cm) (10/22/18 1003)  Weight - Scale: 88 2 kg (194 lb 7 1 oz) (10/22/18 1003)  SpO2: 96 % (10/22/18 1100)    Physical Exam    (   General Appearance:    Alert, cooperative, no distress, appears stated age   Head:    Normocephalic, without obvious abnormality, atraumatic   Eyes:    PERRL, conjunctiva/corneas clear, EOM's intact,             Nose:   Nares normal, septum midline, mucosa normal   Throat:   Lips, mucosa, and tongue normal; teeth and gums normal   Neck:   Supple, symmetrical, no adenopathy;        thyroid:  No enlargement/tenderness/nodules; no carotid    bruit or JVD   Back:     Symmetric, no curvature, ROM normal, no CVA tenderness   Lungs:     Decreased breath sounds otherwise clear to auscultation       Heart:    Regular rate and rhythm, S1 and S2 normal, no murmur, rub    or gallop   Abdomen:     Soft, non-tender, bowel sounds active all four quadrants,     no masses, no organomegaly           Extremities:   Extremities normal, atraumatic, no cyanosis or edema   Pulses:   2+ and symmetric all extremities   Skin:   Skin color, texture, turgor normal, no rashes or lesions   Lymph nodes:   Cervical, supraclavicular, and axillary nodes normal   Neurologic:   CNII-XII intact  Normal strength, sensation and reflexes       throughout       Additional Data:     Lab Results: I have personally reviewed pertinent reports  Results from last 7 days  Lab Units 10/22/18  1044   WBC Thousand/uL 8 32   HEMOGLOBIN g/dL 13 7   HEMATOCRIT % 40 7   PLATELETS Thousands/uL 273   NEUTROS ABS Thousands/µL 5 71   NEUTROS PCT % 68   LYMPHS PCT % 17   MONOS PCT % 10   EOS PCT % 3       Results from last 7 days  Lab Units 10/22/18  1044   SODIUM mmol/L 141   POTASSIUM mmol/L 5 7*   CHLORIDE mmol/L 106   CO2 mmol/L 24   BUN mg/dL 23   CREATININE mg/dL 1 11   ANION GAP mmol/L 11   CALCIUM mg/dL 9 3   ALBUMIN g/dL 3 5   TOTAL BILIRUBIN mg/dL 0 30   ALK PHOS U/L 139*   ALT U/L 24   AST U/L 17       Results from last 7 days  Lab Units 10/22/18  1044   INR  1 02               Results from last 7 days  Lab Units 10/22/18  1044   LACTIC ACID mmol/L 1 2       Imaging: I have personally reviewed pertinent reports  CTA ED chest PE study   Final Result by Lisset Moore MD (10/22 3819)      Multi lobar pulmonary emboli including distal right pulmonary artery  No saddle embolus  Minimal nonspecific mid to distal esophageal wall thickening  While this may simply represent underdistention, consider esophagitis in the appropriate clinical context  Old calcified granulomatous disease  Patchy heterogeneous cortical hypoattenuation bilateral partially visualized kidneys may simply be artifact related to phase of contrast   Correlate with urinalysis to exclude pyelonephritis         I personally discussed pertinent acute findings on this study with Russ Gray on 10/22/2018 at 13:19  Workstation performed: [id number]         XR chest 2 views   Final Result by Chasity Flores DO (10/22 1141)      No acute cardiopulmonary disease  Workstation performed: [id number]             EKG, Pathology, and Other Studies Reviewed on Admission:   · EKG:  Reviewed  CT scan reviewed    Allscripts / Epic Records Reviewed: Yes     ** Please Note: This note has been constructed using a voice recognition system   **

## 2018-10-23 PROBLEM — N17.9 AKI (ACUTE KIDNEY INJURY) (HCC): Status: ACTIVE | Noted: 2018-10-23

## 2018-10-23 LAB
ANION GAP SERPL CALCULATED.3IONS-SCNC: 8 MMOL/L (ref 4–13)
APTT PPP: 98 SECONDS (ref 24–36)
APTT PPP: >210 SECONDS (ref 24–36)
BUN SERPL-MCNC: 24 MG/DL (ref 5–25)
CALCIUM SERPL-MCNC: 8.7 MG/DL (ref 8.3–10.1)
CARDIOLIPIN IGA SER IA-ACNC: <9 APL U/ML (ref 0–11)
CARDIOLIPIN IGG SER IA-ACNC: <9 GPL U/ML (ref 0–14)
CARDIOLIPIN IGM SER IA-ACNC: <9 MPL U/ML (ref 0–12)
CHLORIDE SERPL-SCNC: 107 MMOL/L (ref 100–108)
CO2 SERPL-SCNC: 26 MMOL/L (ref 21–32)
CREAT SERPL-MCNC: 1.35 MG/DL (ref 0.6–1.3)
DEPRECATED AT III PPP: 103 % OF NORMAL (ref 92–136)
ERYTHROCYTE [DISTWIDTH] IN BLOOD BY AUTOMATED COUNT: 13.3 % (ref 11.6–15.1)
EST. AVERAGE GLUCOSE BLD GHB EST-MCNC: 166 MG/DL
GFR SERPL CREATININE-BSD FRML MDRD: 42 ML/MIN/1.73SQ M
GLUCOSE SERPL-MCNC: 107 MG/DL (ref 65–140)
GLUCOSE SERPL-MCNC: 148 MG/DL (ref 65–140)
GLUCOSE SERPL-MCNC: 158 MG/DL (ref 65–140)
GLUCOSE SERPL-MCNC: 223 MG/DL (ref 65–140)
HBA1C MFR BLD: 7.4 % (ref 4.2–6.3)
HCT VFR BLD AUTO: 35.4 % (ref 34.8–46.1)
HGB BLD-MCNC: 11.8 G/DL (ref 11.5–15.4)
INR PPP: 1.15 (ref 0.86–1.17)
MAGNESIUM SERPL-MCNC: 2 MG/DL (ref 1.6–2.6)
MCH RBC QN AUTO: 31.2 PG (ref 26.8–34.3)
MCHC RBC AUTO-ENTMCNC: 33.3 G/DL (ref 31.4–37.4)
MCV RBC AUTO: 94 FL (ref 82–98)
PLATELET # BLD AUTO: 254 THOUSANDS/UL (ref 149–390)
PMV BLD AUTO: 10.2 FL (ref 8.9–12.7)
POTASSIUM SERPL-SCNC: 4.5 MMOL/L (ref 3.5–5.3)
PROTHROMBIN TIME: 14.6 SECONDS (ref 11.8–14.2)
RBC # BLD AUTO: 3.78 MILLION/UL (ref 3.81–5.12)
SODIUM SERPL-SCNC: 141 MMOL/L (ref 136–145)
WBC # BLD AUTO: 7.85 THOUSAND/UL (ref 4.31–10.16)

## 2018-10-23 PROCEDURE — 85610 PROTHROMBIN TIME: CPT | Performed by: FAMILY MEDICINE

## 2018-10-23 PROCEDURE — 99232 SBSQ HOSP IP/OBS MODERATE 35: CPT | Performed by: INTERNAL MEDICINE

## 2018-10-23 PROCEDURE — 80048 BASIC METABOLIC PNL TOTAL CA: CPT | Performed by: FAMILY MEDICINE

## 2018-10-23 PROCEDURE — 85730 THROMBOPLASTIN TIME PARTIAL: CPT | Performed by: FAMILY MEDICINE

## 2018-10-23 PROCEDURE — 83036 HEMOGLOBIN GLYCOSYLATED A1C: CPT | Performed by: FAMILY MEDICINE

## 2018-10-23 PROCEDURE — 83735 ASSAY OF MAGNESIUM: CPT | Performed by: FAMILY MEDICINE

## 2018-10-23 PROCEDURE — 82948 REAGENT STRIP/BLOOD GLUCOSE: CPT

## 2018-10-23 PROCEDURE — 85027 COMPLETE CBC AUTOMATED: CPT | Performed by: FAMILY MEDICINE

## 2018-10-23 PROCEDURE — 99232 SBSQ HOSP IP/OBS MODERATE 35: CPT | Performed by: PHYSICIAN ASSISTANT

## 2018-10-23 RX ORDER — DEXTROSE MONOHYDRATE 25 G/50ML
25 INJECTION, SOLUTION INTRAVENOUS ONCE
Status: COMPLETED | OUTPATIENT
Start: 2018-10-23 | End: 2018-10-23

## 2018-10-23 RX ORDER — SODIUM CHLORIDE 9 MG/ML
100 INJECTION, SOLUTION INTRAVENOUS CONTINUOUS
Status: DISCONTINUED | OUTPATIENT
Start: 2018-10-23 | End: 2018-10-24

## 2018-10-23 RX ADMIN — PRAVASTATIN SODIUM 40 MG: 40 TABLET ORAL at 17:30

## 2018-10-23 RX ADMIN — NIACIN 500 MG: 500 TABLET, FILM COATED, EXTENDED RELEASE ORAL at 20:17

## 2018-10-23 RX ADMIN — METOPROLOL TARTRATE 25 MG: 25 TABLET ORAL at 10:22

## 2018-10-23 RX ADMIN — SODIUM CHLORIDE 100 ML/HR: 0.9 INJECTION, SOLUTION INTRAVENOUS at 17:30

## 2018-10-23 RX ADMIN — CLOPIDOGREL BISULFATE 75 MG: 75 TABLET ORAL at 10:22

## 2018-10-23 RX ADMIN — APIXABAN 10 MG: 5 TABLET, FILM COATED ORAL at 17:30

## 2018-10-23 RX ADMIN — DEXTROSE MONOHYDRATE 25 ML: 25 INJECTION, SOLUTION INTRAVENOUS at 04:19

## 2018-10-23 RX ADMIN — METOPROLOL TARTRATE 25 MG: 25 TABLET ORAL at 20:17

## 2018-10-23 RX ADMIN — ASPIRIN 81 MG 81 MG: 81 TABLET ORAL at 10:22

## 2018-10-23 RX ADMIN — HEPARIN SODIUM AND DEXTROSE 10 UNITS/KG/HR: 10000; 5 INJECTION INTRAVENOUS at 10:23

## 2018-10-23 NOTE — SOCIAL WORK
CM received call from [facility] - cost of Eliquis will be $10  Discussed with pt at bedside who is agreeable  Discussed with RN and SLIM

## 2018-10-23 NOTE — PROGRESS NOTES
Patient concerned re: her continuous blood sugar monitor alarmed, blood glucose 68  Initiated hypoglycemia protocol

## 2018-10-23 NOTE — ASSESSMENT & PLAN NOTE
· Cr  Worsened to 1 35 today  · Baseline per review of records appears to be around 1 0  · Pt had recent CTA performed, concern for possible contrast nephropathy  · Place patient on  ml/hr  · Monitor BMP in the AM

## 2018-10-23 NOTE — PROGRESS NOTES
Progress Note - Pulmonary   Rock Smith 66 y o  female MRN: [id number]  Unit/Bed#: -01 Encounter: [id number]    Assessment:  Acute right-sided PE, unprovoked  Acute right-sided popliteal DVT    Plan:  Patient can be transitioned to Eliquis  Given 1st time event, unprovoked treatment for at least 6 months with and re-evaluation and follow-up with hematology  Also discussed with the patient regarding CT of the abdomen and pelvis without contrast, to rule out any occult malignancy  Screening mammograms  Follow up with Hematology outpatient  Subjective:   Feeling much better today  Objective:     Vitals: Blood pressure 127/52, pulse 76, temperature (!) 97 4 °F (36 3 °C), temperature source Oral, resp  rate 18, height 5' 3" (1 6 m), weight 86 2 kg (190 lb 0 6 oz), SpO2 93 %  ,Body mass index is 33 66 kg/m²  Intake/Output Summary (Last 24 hours) at 10/23/18 1402  Last data filed at 10/23/18 2036   Gross per 24 hour   Intake            71 06 ml   Output                0 ml   Net            71 06 ml       Invasive Devices     Peripheral Intravenous Line            Peripheral IV 10/22/18 Right Antecubital 1 day                Physical Exam:  Not in any acute respiratory distress  Lungs bilateral diminished breath sounds no rhonchi  Heart first and second heart sounds are heard no murmur or gallop is heard  Extremities no pedal edema  CNS awake alert oriented x3  Labs:   CBC:   Lab Results   Component Value Date    WBC 7 85 10/23/2018    HGB 11 8 10/23/2018    HCT 35 4 10/23/2018    MCV 94 10/23/2018     10/23/2018    MCH 31 2 10/23/2018    MCHC 33 3 10/23/2018    RDW 13 3 10/23/2018    MPV 10 2 10/23/2018     Imaging and other studies: I have personally reviewed pertinent reports

## 2018-10-23 NOTE — PLAN OF CARE
Problem: Potential for Falls  Goal: Patient will remain free of falls  INTERVENTIONS:  - Assess patient frequently for physical needs  -  Identify cognitive and physical deficits and behaviors that affect risk of falls    -  [place] fall precautions as indicated by assessment   - Educate patient/family on patient safety including physical limitations  - Instruct patient to call for assistance with activity based on assessment  - Modify environment to reduce risk of injury  - Consider OT/PT consult to assist with strengthening/mobility   Outcome: Progressing

## 2018-10-23 NOTE — PLAN OF CARE
Problem: DISCHARGE PLANNING - CARE MANAGEMENT  Goal: Discharge to post-acute care or home with appropriate resources  INTERVENTIONS:  - Conduct assessment to determine patient/family and health care team treatment goals, and need for post-acute services based on payer coverage, community resources, and patient preferences, and barriers to discharge  - Address psychosocial, clinical, and financial barriers to discharge as identified in assessment in conjunction with the patient/family and health care team  - Arrange appropriate level of post-acute services according to patients   needs and preference and payer coverage in collaboration with the physician and health care team  - Communicate with and update the patient/family, physician, and health care team regarding progress on the discharge plan  - Arrange appropriate transportation to post-acute venues  Outcome: Progressing  CM received call from [facility] - cost of Eliquis will be $10  Discussed with pt at bedside who is agreeable  Discussed with RN and SLIM

## 2018-10-23 NOTE — UTILIZATION REVIEW
Initial Clinical Review    Admission: Date/Time/Statement: 10/22/18 @ 1332     Orders Placed This Encounter   Procedures    Inpatient Admission (expected length of stay for this patient is greater than two midnights)     Standing Status:   Standing     Number of Occurrences:   1     Order Specific Question:   Admitting Physician     Answer:   Cassandria Lombard     Order Specific Question:   Level of Care     Answer:   Med Surg [16]     Order Specific Question:   Estimated length of stay     Answer:   More than 2 Midnights     Order Specific Question:   Certification     Answer:   I certify that inpatient services are medically necessary for this patient for a duration of greater than two midnights  See H&P and MD Progress Notes for additional information about the patient's course of treatment  ED: Date/Time/Mode of Arrival:   ED Arrival Information     Expected Arrival Acuity Means of Arrival Escorted By Service Admission Type    - 10/22/2018 09:56 [facility]gent [facility] General Medicine Emergency    Arrival Complaint    SOB          Chief Complaint:   Chief Complaint   Patient presents with    Shortness of Breath     easier to breathe when lying down   Nitro given by school nurse @8519       History of Illness: 67 yo female to ED w/ SOB began yest and getting progressively worse  C/o feeling lightheaded and dizzy       ED Vital Signs:   ED Triage Vitals   Temperature Pulse Respirations Blood Pressure SpO2   10/22/18 1011 10/22/18 1003 10/22/18 1003 10/22/18 1003 10/22/18 1003   98 °F (36 7 °C) 91 14 (!) 248/110 96 %      Temp Source Heart Rate Source Patient Position - Orthostatic VS BP Location FiO2 (%)   10/22/18 1011 10/22/18 1003 10/22/18 1003 10/22/18 1003 --   Oral Monitor Lying Left arm       Pain Score       10/22/18 1003       No Pain        Wt Readings from Last 1 Encounters:   10/22/18 86 2 kg (190 lb 0 6 oz)       Vital Signs (abnormal):   10/22/18 1548  --  90  20  135/90 --  98 %  None (Room air)  Lying   10/22/18 1239  --  --  --  --  --  --  None (Room air)  --   10/22/18 1100  --  91  14   185/79  --  96 %  --  --   10/22/18 1030  --  90   31   223/96  --  96 %  --  --     Abnormal Labs/Diagnostic Test Results: d-dimer >54878, alk phos  139, BNP  194, K  5 7, gluc 195  CT chest- Multi lobar pulmonary emboli including distal right pulmonary artery   No saddle embolus  Minimal nonspecific mid to distal esophageal wall thickening   While this may simply represent underdistention, consider esophagitis in the appropriate clinical context  Old calcified granulomatous disease  Patchy heterogeneous cortical hypoattenuation bilateral partially visualized kidneys may simply be artifact related to phase of contrast   Correlate with urinalysis to exclude pyelonephritis    CXR - wnl   EKG- NSR        ED Treatment:   Medication Administration from 10/22/2018 0956 to 10/22/2018 1612       Date/Time Order Dose Route Action Action by Comments     10/22/2018 1039 labetalol (NORMODYNE) injection 10 mg 10 mg Intravenous Given Florentino Alejandro RN      10/22/2018 1204 labetalol (NORMODYNE) injection 10 mg 10 mg Intravenous Given Florentino Alejandro RN      10/22/2018 1340 labetalol (NORMODYNE) injection 10 mg 10 mg Intravenous Not Given Florentino Alejandro RN discontinued     10/22/2018 1218 ondansetron (ZOFRAN) injection 4 mg 4 mg Intravenous Given Florentino Alejandro RN      10/22/2018 1255 iohexol (OMNIPAQUE) 350 MG/ML injection (MULTI-DOSE) 85 mL 85 mL Intravenous Given Parrish Olivo      10/22/2018 1420 heparin (porcine) injection 6,800 Units 6,800 Units Intravenous Given Florentino Alejandro RN      10/22/2018 1601 heparin (porcine) 25,000 units in 250 mL infusion (premix) 18 Units/kg/hr Intravenous Rate/Dose Verify Alisia Retana RN      10/22/2018 1421 heparin (porcine) 25,000 units in 250 mL infusion (premix) 18 Units/kg/hr Intravenous New Bag Florentino Alejandro RN Past Medical/Surgical History: Active Ambulatory Problems     Diagnosis Date Noted    Coronary artery disease involving native coronary artery of native heart without angina pectoris 02/01/2018    Hyperlipidemia 02/01/2018    Asymptomatic carotid artery stenosis 11/07/2012    Essential hypertension 11/17/2016    Insulin pump status 07/02/2015    Old myocardial infarction 08/22/2013    [facility] 10/07/2014    Type 1 diabetes mellitus with sensory neuropathy (HCC) 10/07/2014    Sore throat 06/05/2018    Acute ear pain, bilateral 06/05/2018    Urinary tract infection without hematuria 06/05/2018     Past Medical History:   Diagnosis Date    Acute myocardial infarction [facility])     Carotid artery occlusion     Coronary artery disease     Diabetes mellitus ([facility] )     Ectopic pregnancy     Hyperlipidemia     Hypertension        Admitting Diagnosis: Dyspnea [R06 00]  SOB (shortness of breath) [R06 02]  Uncontrolled hypertension [I10]  Pulmonary emboli ([place] [address] ) [I26 99]    Age/Sex: 66 y o  female    Assessment/Plan: 67 yo female admitted w/ hyperkalemia - will hold ACE , recheck K and give some kayexalate   HTN crisis has improved , give hydralazine prn and cont home meds  R wrist fx closed- f/u ortho as OP , non operative  DM- hgb a1c  PE - acute PE - check echo will start heparin gtt , pulm consult , monitor        Admission Orders:  Scheduled Meds:   Current Facility-Administered Medications:  aspirin 81 mg Oral Daily     clopidogrel 75 mg Oral Daily     heparin (porcine) 3-30 Units/kg/hr (Order-Specific) Intravenous Titrated  Last Rate: 10 Units/kg/hr (10/23/18 1023)   insulin lispro 1 Units Subcutaneous Continuous     metoprolol tartrate 25 mg Oral Q12H [address]     niacin 500 mg Oral Daily     nitroglycerin 0 4 mg Sublingual Q5 Min PRN     pravastatin 40 mg Oral Daily With Dinner       Fingerstick ac and hs   Tele   pulm consult   10/23 ptt , pt inr , hgb a1c , mg ,bmp , cbc BUN creat   24 1 35, gluc 158, hgb a1c 7 4, pt inr  14 6 1 15, ptt 98

## 2018-10-23 NOTE — SOCIAL WORK
CM notified pt will require Eliquis at D/C  Script received  CM spoke with pt at bedside who reports she typically uses [facility] on [address] and express scripts  Script, free 30 day card and facesheet faxed to [facility] [address] to cost check  CM awaiting call back

## 2018-10-23 NOTE — PROGRESS NOTES
Patient's blood glucose carla steadily to 73, then 98 and then 115 at this time  Patient remained asymptomatic throughout

## 2018-10-23 NOTE — ASSESSMENT & PLAN NOTE
· POA  · This is now improved, blood pressures have remained stable  · Continue lopressor 25 mg BID  · Continue to hold lisinopril due to recent increase in creatinine s/p CTA  · Monitor

## 2018-10-23 NOTE — ASSESSMENT & PLAN NOTE
Lab Results   Component Value Date    HGBA1C 7 4 (H) 10/23/2018       Recent Labs      10/23/18   1056   POCGLU  148*       Blood Sugar Average: Last 72 hrs:  (P) 148  Pt is on insulin pump, continue  Consistent carb diet   QID glucose checks

## 2018-10-23 NOTE — PROGRESS NOTES
Progress Note - Berna Maldonado 1955, 66 y o  female MRN: [id number]    Unit/Bed#: -01 Encounter: [id number]    Primary Care Provider: Von Beckford PA-C   Date and time admitted to hospital: 10/22/2018  9:57 AM        DOS: 10/23/2018    Addendum: Discussed with CM, pt agreeable with Eliquis as outpatient  Will transition patient from heparin gtt to Eliquis now  Monitor  * Pulmonary embolism (HCC)   Assessment & Plan    · As evidenced on CTA - multilobar PE on the right pulmonary artery   · ECHO - EF 65%, no RWMA, does have evidence of right heart strain with estimated peak pressure of the right ventricle at least 50 mmHg  · VAS duplex with evidence of right popliteal DVT  · Pulmonary following,  · Thrombosis panel is pending  · Pt to have CT abdomen/pelvis w/o contrast as outpatient to evaluate for malignancy, pt also to follow up with hematology  · Able to transition patient to Eliquis once pricing information available per pulmonary  · Continue heparin gtt for now in the meantime   · Pt to be treated with a/c for at least 6 months and follow up with hematology, she is to have repeat ECHO in 3 months as well     MARIA ESTHER (acute kidney injury) ([facility] [address] )   Assessment & Plan    · Cr  Worsened to 1 35 today  · Baseline per review of records appears to be around 1 0  · Pt had recent CTA performed, concern for possible contrast nephropathy  · Place patient on  ml/hr  · Monitor BMP in the AM     Hyperkalemia   Assessment & Plan    · Continue to hold lisinopril  · Resolved  · Continue to monitor BMP in the AM     Hypertensive crisis   Assessment & Plan    · POA  · This is now improved, blood pressures have remained stable  · Continue lopressor 25 mg BID  · Continue to hold lisinopril due to recent increase in creatinine s/p CTA  · Monitor     Wrist fracture, closed, right, initial encounter   Assessment & Plan    · S/p recent fall about 5 weeks ago   · Follow up with Orthopedics as an outpatient    This is non operative  · Stable     Type 1 diabetes mellitus with sensory neuropathy [facility])   Assessment & Plan    Lab Results   Component Value Date    HGBA1C 7 4 (H) 10/23/2018       Recent Labs      10/23/18   1056   POCGLU  148*       Blood Sugar Average: Last 72 hrs:  (P) 148  Pt is on insulin pump, continue  Consistent carb diet   QID glucose checks     Coronary artery disease involving native coronary artery of native heart without angina pectoris   Assessment & Plan    · S/p stent  · Continue ASA, plavix, BB  · Stable, denies any chest pain symptoms       VTE Pharmacologic Prophylaxis:   Pharmacologic: Heparin Drip  Mechanical VTE Prophylaxis in Place: Yes    Patient Centered Rounds: I have evaluated patient without nursing staff present due to Speaking to nurse outside patient's room    Discussions with Specialists or Other Care Team Provider:  Discussed with Pulmonary, RN, cm and reviewed previous notes    Education and Discussions with Family / Patient:  Discussed with patient and patient's  at bedside    Time Spent for Care: 30 minutes  More than 50% of total time spent on counseling and coordination of care as described above  Current Length of Stay: 1 day(s)    Current Patient Status: Inpatient   Certification Statement: The patient will continue to require additional inpatient hospital stay due to monitoring creatinine, starting pt on PO a/c     Discharge Plan: Not medically stable  Anticipate next 24 - 48 hours if improvement of cr  Code Status: No Order      Subjective:   Patient reports that she no longer has any shortness of breath  She denies any lightheadedness or dizziness  Also currently denies any chest pain, abdominal pain, nausea, vomiting, diarrhea, constipation or urinary difficulties  Patient states she does not know why she was NPO but she is on insulin pump and she did have an episode of hypoglycemia this morning    She reports that she would rather have the CT scan of her abdomen and pelvis as an outpatient due to her implantable glucose monitor  Objective:     Vitals:   Temp (24hrs), Av °F (36 7 °C), Min:97 4 °F (36 3 °C), Max:98 5 °F (36 9 °C)    Temp:  [97 4 °F (36 3 °C)-98 5 °F (36 9 °C)] 97 4 °F (36 3 °C)  HR:  [72-92] 76  Resp:  [16-20] 18  BP: (125-157)/(52-90) 127/52  SpO2:  [93 %-98 %] 93 %  Body mass index is 33 66 kg/m²  Input and Output Summary (last 24 hours): Intake/Output Summary (Last 24 hours) at 10/23/18 1455  Last data filed at 10/23/18 4698   Gross per 24 hour   Intake            71 06 ml   Output                0 ml   Net            71 06 ml       Physical Exam:     Physical Exam   Constitutional: She appears well-nourished  No distress  Patient is in no acute distress sitting in her hospital chair resting comfortably accompanied by her    HENT:   Head: Normocephalic and atraumatic  Eyes: Conjunctivae are normal  No scleral icterus  Cardiovascular: Normal rate, regular rhythm and intact distal pulses  Pulmonary/Chest: Effort normal  No respiratory distress  She has no wheezes  She has no rales  Decreased breath sounds bilaterally   Abdominal: Soft  Bowel sounds are normal  She exhibits no distension  There is no tenderness  There is no rebound  Musculoskeletal: She exhibits no edema  Appears euvolemic   Neurological: She is alert  Skin: Skin is warm and dry  She is not diaphoretic  No erythema  Psychiatric: She has a normal mood and affect  Vitals reviewed        Additional Data:     Labs:      Results from last 7 days  Lab Units 10/23/18  0433 10/22/18  1044   WBC Thousand/uL 7 85 8 32   HEMOGLOBIN g/dL 11 8 13 7   HEMATOCRIT % 35 4 40 7   PLATELETS Thousands/uL 254 273   NEUTROS PCT %  --  68   LYMPHS PCT %  --  17   MONOS PCT %  --  10   EOS PCT %  --  3       Results from last 7 days  Lab Units 10/23/18  0433  10/22/18  1044   SODIUM mmol/L 141  < > 141   POTASSIUM mmol/L 4 5  < > 5 7*   CHLORIDE mmol/L 107  < > 106 CO2 mmol/L 26  < > 24   BUN mg/dL 24  < > 23   CREATININE mg/dL 1 35*  < > 1 11   CALCIUM mg/dL 8 7  < > 9 3   ALK PHOS U/L  --   --  139*   ALT U/L  --   --  24   AST U/L  --   --  17   < > = values in this interval not displayed  Results from last 7 days  Lab Units 10/23/18  0732   INR  1 15       * I Have Reviewed All Lab Data Listed Above  * Additional Pertinent Lab Tests Reviewed: All Labs Within Last 24 Hours Reviewed    Imaging:    Imaging Reports Reviewed Today Include: CXR, CTA  Imaging Personally Reviewed by Myself Includes:  none    Recent Cultures (last 7 days):           Last 24 Hours Medication List:     Current Facility-Administered Medications:  aspirin 81 mg Oral Daily Jace Eckert MD    clopidogrel 75 mg Oral Daily Jace Eckert MD    heparin (porcine) 3-30 Units/kg/hr (Order-Specific) Intravenous Titrated Salima Weathers DO Last Rate: 10 Units/kg/hr (10/23/18 1023)   insulin lispro 1 Units Subcutaneous Continuous Ash Ortega MD    metoprolol tartrate 25 mg Oral Q12H [address] Jace Eckert MD    niacin 500 mg Oral Daily Jace Eckert MD    nitroglycerin 0 4 mg Sublingual Q5 Min PRN Jace Eckert MD    pravastatin 40 mg Oral Daily With Jorge Maloney MD    sodium chloride 100 mL/hr Intravenous Continuous Jaimie Davis PA-C         Today, Patient Was Seen By: Jaimie Davis PA-C    ** Please Note: Dictation voice to text software may have been used in the creation of this document   **

## 2018-10-23 NOTE — ASSESSMENT & PLAN NOTE
· S/p recent fall about 5 weeks ago   · Follow up with Orthopedics as an outpatient  This is non operative    · Stable

## 2018-10-23 NOTE — ASSESSMENT & PLAN NOTE
· As evidenced on CTA - multilobar PE on the right pulmonary artery   · ECHO - EF 65%, no RWMA, does have evidence of right heart strain with estimated peak pressure of the right ventricle at least 50 mmHg  · Pulmonary following,  · Thrombosis panel is pending  · Pt to have CT abdomen/pelvis w/o contrast as outpatient to evaluate for malignancy, pt also to follow up with hematology  · Able to transition patient to Eliquis once pricing information available per pulmonary  · Continue heparin gtt for now in the meantime   · Pt to be treated with a/c for at least 6 months and follow up with hematology, she is to have repeat ECHO in 3 months as well

## 2018-10-24 VITALS
BODY MASS INDEX: 33.67 KG/M2 | OXYGEN SATURATION: 95 % | TEMPERATURE: 98.4 F | WEIGHT: 190.04 LBS | SYSTOLIC BLOOD PRESSURE: 147 MMHG | HEIGHT: 63 IN | DIASTOLIC BLOOD PRESSURE: 67 MMHG | HEART RATE: 70 BPM | RESPIRATION RATE: 20 BRPM

## 2018-10-24 LAB
ANION GAP SERPL CALCULATED.3IONS-SCNC: 10 MMOL/L (ref 4–13)
B2 GLYCOPROT1 IGA SER-ACNC: <9 GPI IGA UNITS (ref 0–25)
B2 GLYCOPROT1 IGG SER-ACNC: <9 GPI IGG UNITS (ref 0–20)
B2 GLYCOPROT1 IGM SER-ACNC: <9 GPI IGM UNITS (ref 0–32)
BUN SERPL-MCNC: 30 MG/DL (ref 5–25)
CALCIUM SERPL-MCNC: 8.6 MG/DL (ref 8.3–10.1)
CHLORIDE SERPL-SCNC: 107 MMOL/L (ref 100–108)
CO2 SERPL-SCNC: 23 MMOL/L (ref 21–32)
CREAT SERPL-MCNC: 1.28 MG/DL (ref 0.6–1.3)
GFR SERPL CREATININE-BSD FRML MDRD: 45 ML/MIN/1.73SQ M
GLUCOSE SERPL-MCNC: 120 MG/DL (ref 65–140)
GLUCOSE SERPL-MCNC: 175 MG/DL (ref 65–140)
GLUCOSE SERPL-MCNC: 99 MG/DL (ref 65–140)
POTASSIUM SERPL-SCNC: 4.4 MMOL/L (ref 3.5–5.3)
PROT S ACT/NOR PPP: 20 % (ref 63–140)
PROT S ACT/NOR PPP: <25 % (ref 57–157)
PROT S PPP-ACNC: 64 % (ref 60–150)
SODIUM SERPL-SCNC: 140 MMOL/L (ref 136–145)

## 2018-10-24 PROCEDURE — 99238 HOSP IP/OBS DSCHRG MGMT 30/<: CPT | Performed by: NURSE PRACTITIONER

## 2018-10-24 PROCEDURE — 82948 REAGENT STRIP/BLOOD GLUCOSE: CPT

## 2018-10-24 PROCEDURE — 80048 BASIC METABOLIC PNL TOTAL CA: CPT | Performed by: PHYSICIAN ASSISTANT

## 2018-10-24 RX ADMIN — CLOPIDOGREL BISULFATE 75 MG: 75 TABLET ORAL at 09:25

## 2018-10-24 RX ADMIN — APIXABAN 10 MG: 5 TABLET, FILM COATED ORAL at 09:25

## 2018-10-24 RX ADMIN — METOPROLOL TARTRATE 25 MG: 25 TABLET ORAL at 09:25

## 2018-10-24 RX ADMIN — SODIUM CHLORIDE 100 ML/HR: 0.9 INJECTION, SOLUTION INTRAVENOUS at 02:46

## 2018-10-24 RX ADMIN — ASPIRIN 81 MG 81 MG: 81 TABLET ORAL at 09:25

## 2018-10-24 NOTE — ASSESSMENT & PLAN NOTE
· As evidenced on CTA - multilobar PE on the right pulmonary artery   · ECHO - EF 65%, no RWMA, does have evidence of right heart strain with estimated peak pressure of the right ventricle at least 50 mmHg  · Pulmonary following,  · [facility]  · Pt to have CT abdomen/pelvis w/o contrast as outpatient to evaluate for malignancy, pt also to follow up with hematology  · Transitioned to Eliquis  · Pt to be treated with a/c for at least 6 months and follow up with hematology, she is to have repeat ECHO in 3 months as well

## 2018-10-24 NOTE — SOCIAL WORK
Patient identified as High Risk for Readmission  HRR workflow completed  Pt reports she prefers to schedule her own PCP appt and will do so once returning home with goal for appt within 72 hours  Referral made to [facility]  Pt denies other needs at this time  CM will continue to follow

## 2018-10-24 NOTE — ASSESSMENT & PLAN NOTE
Lab Results   Component Value Date    HGBA1C 7 4 (H) 10/23/2018       Recent Labs      10/23/18   1614  10/23/18   2141  10/24/18   0628  10/24/18   1037   POCGLU  223*  107  99  175*       Blood Sugar Average: Last 72 hrs:  (P) 150 4  Pt is on insulin pump, continue  Consistent carb diet

## 2018-10-24 NOTE — DISCHARGE INSTRUCTIONS
Acute Kidney Injury, Ambulatory Care   GENERAL INFORMATION:   Acute kidney injury  happens when your kidneys suddenly stop working correctly  Normally, the kidneys turn fluid, chemicals, and waste from your blood into urine  In acute kidney injury, your kidneys can no longer do this  In most cases, it is temporary, but it may become a chronic kidney condition  Common symptoms include the following:   · Decreased urination or dark-colored urine    · Swelling in your arms, legs, or feet     · Abdominal or low back pain    · Vomiting, diarrhea, or loss of appetite    · Fatigue     · Skin rash  Seek immediate care for the following symptoms:   · Heart beating faster than normal for you    · Sudden chest pain or trouble breathing    · Seizure  Treatment for acute kidney injury:  Treatment depends upon the cause of your acute kidney injury and how severe it is  Medicines may be given to increase blood flow to your kidneys and protect your kidneys  You may also need medicine to decrease inflammation in your kidneys  You may be given IV fluids to replenish fluids and help your heart pump blood  Dialysis may be needed to remove chemicals and waste from your blood when your kidneys cannot  Manage acute kidney injury:   · Manage other health conditions  Care for your diabetes, high blood pressure, or heart disease  These conditions increase your risk for acute kidney injury  · Talk to your healthcare provider before you take over-the-counter-medicine  NSAIDs, stomach medicine, or laxatives may harm your kidneys and increase your risk for acute kidney injury  Follow up with your healthcare provider as directed:  Write down your questions so you remember to ask them during your visits  CARE AGREEMENT:   You have the right to help plan your care  Learn about your health condition and how it may be treated  Discuss treatment options with your caregivers to decide what care you want to receive   You always have the right to refuse treatment  The above information is an  only  It is not intended as medical advice for individual conditions or treatments  Talk to your doctor, nurse or pharmacist before following any medical regimen to see if it is safe and effective for you  © 2014 [address] is for End User's use only and may not be sold, redistributed or otherwise used for commercial purposes  All illustrations and images included in CareNotes® are the copyrighted property of [employer]  or Adrian Oliveira  Arm Fracture in Adults   WHAT YOU NEED TO KNOW:   An arm fracture is a crack or break in one or more of the bones in your arm  An arm fracture may be caused by a fall onto an outstretched hand  It may also be caused by trauma from a car accident or a sports injury  Osteoporosis (brittle bones) can increase your risk for a fracture  DISCHARGE INSTRUCTIONS:   Seek care immediately if:   · The pain in your injured arm does not get better or gets worse, even after you rest and take medicine  · Your injured arm, hand, or fingers feel numb  · Your arm is swollen, red, and feels warm  · Your skin over the arm fracture is swollen, cold, or pale  · You cannot move your arm, hand, or fingers  Contact your healthcare provider if:   · You have a fever  · Your brace or splint becomes wet, damaged, comes off  · You have questions or concerns about your injury, treatment, or care  Medicines:   · NSAIDs , such as ibuprofen, help decrease swelling and pain  This medicine is available with or without a doctor's order  NSAIDs can cause stomach bleeding or kidney problems in certain people  If you take blood thinner medicine, always ask your healthcare provider if NSAIDs are safe for you  Always read the medicine label and follow directions  · Acetaminophen  decreases pain  It is available without a doctor's order  Ask how much to take and how often to take it  Follow directions  Acetaminophen can cause liver damage if not taken correctly  · Prescription pain medicine  may be given  Ask how to take this medicine safely  · Take your medicine as directed  Contact your healthcare provider if you think your medicine is not helping or if you have side effects  Tell him or her if you are allergic to any medicine  Keep a list of the medicines, vitamins, and herbs you take  Include the amounts, and when and why you take them  Bring the list or the pill bottles to follow-up visits  Carry your medicine list with you in case of an emergency  Follow up with your healthcare provider within 1 week: You may need to see a bone specialist within 3 to 4 days if you need surgery or further treatment for your arm fracture  Write down your questions so you remember to ask them during your visits  Rest:  You should rest your arm as much as possible  Ask your healthcare provider when you can put pressure or weight on your arm  Also ask when you can return to sports or vigorous exercises  Ice:  Apply ice on your arm for 15 to 20 minutes every hour or as directed  Use an ice pack, or put crushed ice in a plastic bag  Cover it with a towel  Ice helps prevent tissue damage and decreases swelling and pain  Elevate:  Elevate your arm above the level of your heart as often as you can  This will help decrease swelling and pain  Prop your arm on pillows or blankets to keep it elevated comfortably  Care for your cast or splint:  Ask your healthcare provider when it is okay to bathe  Do not get your cast or splint wet  Before you take a bath or shower, cover your cast or splint with a plastic bag  Tape the bag to your skin to help keep water out  Hold your arm away from the water in case the bag leaks  · Check the skin around your cast or splint each day for any redness or open skin  · Do not use a sharp or pointed object to scratch your skin under the cast or splint    Physical therapy: A physical therapist teaches you exercises to help improve movement and strength, and to decrease pain  © 2017 [address]  Information is for End User's use only and may not be sold, redistributed or otherwise used for commercial purposes  All illustrations and images included in CareNotes® are the copyrighted property of [employer]  or Adrian Oliveira  The above information is an  only  It is not intended as medical advice for individual conditions or treatments  Talk to your doctor, nurse or pharmacist before following any medical regimen to see if it is safe and effective for you  Coronary Artery Disease   WHAT YOU NEED TO KNOW:   What is coronary artery disease? Coronary artery disease (CAD) is narrowing of the arteries to your heart caused by a buildup of plaque  Plaque is made up of cholesterol and other substances  The narrowing in your arteries decreases the amount of blood that can flow to your heart  This causes your heart to get less oxygen  What increases my risk for CAD? · Age 36 years or older     · A family history of CAD     · Smoking or regular exposure to secondhand smoke     · A medical condition, such as high blood pressure, high cholesterol, or diabetes     · Obesity or lack of exercise  What are the signs and symptoms of CAD? You may not have any symptoms of CAD  The most common symptom is chest pain, also called angina  Angina may feel like burning, squeezing, or crushing tightness in your chest  The pain may spread to your neck, jaw, or shoulder blade  You may have other symptoms along with chest pain  These include nausea, vomiting, sweating, fainting, and hands and feet that are cold to the touch  How is CAD diagnosed? Your healthcare provider will ask you if you have a family history of CAD  He will also ask about your symptoms and about the medicines you are taking   You may need any of the following:  · Blood tests  will check for high cholesterol or other medical conditions that may have led to CAD  · An EKG  records the electrical activity of your heart  It is used to check your heart rhythm, and it may show if there is damage to your heart  · An echocardiogram  is a type of ultrasound  Sound waves are used to show the structure, movement, and blood vessels of your heart  · An exercise stress test  helps healthcare providers see the changes that take place in your heart during exercise  An EKG is done while you ride an exercise bike or walk on a treadmill  Healthcare providers will ask if you have chest pain or trouble breathing during the test  An exercise test may be combined with an echocardiogram or nuclear isotope imaging  Nuclear isotopes are very small amounts of radioactive material that are injected into your bloodstream  Images show areas of your heart that have decreased blood flow  · A stress test with medicine  may be done if you cannot do the exercise stress test  You are given medicine that causes your heart to work harder  You will be connected to a stress test machine  This test is combined with the echocardiogram or nuclear isotope imaging  · An angiography, CT scan, or MRI  may be done to take pictures of your blood vessels and arteries  The pictures may show how narrow or blocked your blood vessels are  You may be given contrast liquid to help healthcare providers see the pictures better  Tell the healthcare provider if you have ever had an allergic reaction to contrast liquid  Which medicines are used to treat CAD? · Blood pressure medicines  are given to lower your blood pressure  These medicines may include ACE inhibitors and beta-blockers  ACE inhibitors help keep your blood vessels relaxed and open  This helps keep blood flowing into your heart  Beta-blockers keep your heart pumping strongly and regularly  This helps keep your heart from working too hard to get oxygen       · Cholesterol medicines  help lower blood cholesterol levels  · Nitrates , such as nitroglycerin, relax the arteries of your heart so it gets more oxygen  They help to relieve your chest pain  · Antiplatelets , such as aspirin, help prevent blood clots  Take your antiplatelet medicine exactly as directed  These medicines make it more likely for you to bleed or bruise  If you are told to take aspirin, do not take acetaminophen or ibuprofen instead  · Blood thinners  keep clots from forming in your blood  Clots may cause heart attacks, strokes, or death  This medicine makes it more likely for you to bleed or bruise  · Do not take certain medicines without asking your healthcare provider first   These include NSAIDs, herbal or vitamin supplements, or hormones (estrogen or progestin)  Which procedures are used to treat CAD? · An angioplasty  may be done to open an artery blocked by plaque  A tube with a balloon on the end is threaded into the blocked artery  Once the tube is in the artery, the balloon is inflated  As the balloon inflates, it presses the plaque against the artery wall to open the artery  A stent may be placed in your artery to keep it open  · Coronary artery bypass surgery (CABG)  is open heart surgery  Healthcare providers take arteries or veins from other areas in your body and use them to bypass or go around the blocked arteries of your heart  What is cardiac rehabilitation? Your healthcare provider may recommend that you attend cardiac rehabilitation (rehab)  This is a program run by specialists who will help you safely strengthen your heart and reduce the risk for more heart disease  The plan includes exercise, relaxation, stress management, and heart-healthy nutrition  Healthcare providers will also check to make sure any medicines you are taking are working  What can I do to manage CAD? · Do not smoke    Nicotine and other chemicals in cigarettes and cigars can cause heart and lung damage  Ask your healthcare provider for information if you currently smoke and need help to quit  E-cigarettes or smokeless tobacco still contain nicotine  Talk to your healthcare provider before you use these products  · Exercise regularly  Exercise at least 30 minutes each day, on most days of the week  Exercise helps to lower high cholesterol and high blood pressure  It can also help you maintain a healthy weight  Ask your healthcare provider about the kind of exercise you should do and how to get started  · Maintain a healthy weight  If you are overweight, talk to your healthcare provider about how to lose weight  A weight loss of 10% can improve your heart health  · Eat heart-healthy foods  Include fresh fruits and vegetables in your meal plan  Choose low-fat foods, such as skim or 1% fat milk, low-fat cheese and yogurt, fish, chicken (without skin), and lean meats  Eat two 4-ounce servings of fish high in omega-3 fats each week, such as salmon, fresh tuna, and herring  Do not eat foods that are high in sodium, such as canned foods, potato chips, salty snacks, and cold cuts  Put less table salt on your food  · Limit or do not drink alcohol  A drink of alcohol is 12 ounces of beer, 5 ounces of wine, or 1½ ounces of liquor  · Manage other health conditions  Follow your healthcare provider's advice on how to manage other conditions that can affect your heart health  These include diabetes, high blood pressure, and high cholesterol  You may need to take medicines for these conditions and make other lifestyle changes  · Ask if you should have a flu vaccine  The flu can be dangerous for a person who has CAD  The flu vaccine is available every year in the fall         Call 911 for any of the following:   · You have any of the following signs of a heart attack:      ¨ Squeezing, pressure, or pain in your chest that lasts longer than 5 minutes or returns    ¨ Discomfort or pain in your back, neck, jaw, stomach, or arm     ¨ Trouble breathing    ¨ Nausea or vomiting    ¨ Lightheadedness or a sudden cold sweat, especially with chest pain or trouble breathing    When should I contact my healthcare provider? · You have chest pain that is more frequent, or you have chest pain at rest      · You have questions or concerns about your condition or care  CARE AGREEMENT:   You have the right to help plan your care  Learn about your health condition and how it may be treated  Discuss treatment options with your caregivers to decide what care you want to receive  You always have the right to refuse treatment  The above information is an  only  It is not intended as medical advice for individual conditions or treatments  Talk to your doctor, nurse or pharmacist before following any medical regimen to see if it is safe and effective for you  © 2017 [address] Information is for End User's use only and may not be sold, redistributed or otherwise used for commercial purposes  All illustrations and images included in CareNotes® are the copyrighted property of [employer]  or Adrian Oliveira  Diabetic Peripheral Neuropathy   WHAT YOU NEED TO KNOW:   What is diabetic peripheral neuropathy (DPN)? DPN is damage to the nerves in your arms, hands, legs, and feet  DPN is most common in the legs and feet and can increase your risk for foot ulcers  Nerve pain caused by DPN can limit your mobility, and affect your quality of life  What increases my risk for DPN?   · Poor blood sugar control    · High blood pressure, high cholesterol, or lack of vitamin D    · Tobacco use  What are the signs and symptoms of DPN? You may have no symptoms at all   You may have any of the following:  · Increased sensitivity to touch     · Muscle weakness or problems balancing or walking     · Burning, tingling, cramping, or pain in your feet or hands    · Not able to feel hot, cold, or pressure due to loss of protective sensation (LOPS)    · Decreased muscle and fat around your feet     · Decreased movement of your ankles     · Foot redness, warmth, or calluses  How is DPN diagnosed and treated? Your healthcare provider will check your reflexes, strength, and flexibility  A 10-g monofilament test will be done  A monofilament is a device with one flexible strand that will touch the soles of your feet  Your healthcare provider may also do a pinprick, temperature, or vibration sensation test  Controlled blood sugar levels is the only treatment for DPN  Controlled blood sugar levels cannot reverse nerve damage  You may be able to prevent or delay DPN, if you have type 1 diabetes  If you have type 2 diabetes, controlled blood sugar levels may slow the progression  Your healthcare provider may give medicines to help with the nerve pain  How can I control my blood sugar levels? Keep your blood sugar levels as close to normal as possible by taking your medicines as directed  Check your blood sugar levels as often as directed  Contact your healthcare provider if your levels are higher than they should be  · Follow the meal plan  that your healthcare or dietitian gave you  This meal plan can help you control your blood sugar and decrease your symptoms  · Exercise regularly  Exercise can help keep your blood sugar level steady and help you manage your weight  Exercise for at least 30 minutes, 5 days a week  Ask your healthcare provider about the best exercise plan for you  Use caution when you exercise if you have decreased feeling in your feet  · Maintain a healthy weight  Ask your healthcare provider how much you should weigh  A healthy weight can help you control your diabetes  Ask him to help you create a weight loss plan if you are overweight  Even a 10 to 15 pound weight loss can help you manage your blood sugar level  · Limit alcohol    Alcohol affects your blood sugar level and can make it harder to manage your diabetes  Women should limit alcohol to 1 drink a day  Men should limit alcohol to 2 drinks a day  A drink of alcohol is 12 ounces of beer, 5 ounces of wine, or 1½ ounces of liquor  What else can I do to prevent or manage DPN?   · Care for your feet  Check your feet each day for cuts, scratches, calluses, or other wounds  Look for redness and swelling, and feel for warmth  Wear shoes that fit well  Check your shoes for rocks or other objects that can hurt your feet  Do not walk barefoot or wear shoes without socks  Wear cotton socks to help keep your feet dry  · Do not smoke  Nicotine can worsen your symptoms and make it more difficult to manage your diabetes  Do not use e-cigarettes or smokeless tobacco in place of cigarettes or to help you quit  They still contain nicotine  Ask your healthcare provider for information if you currently smoke and need help quitting  When should I seek immediate care? · Your legs or feet start to turn blue or black  · You have a wound that does not heal or is red, swollen, or draining fluid  When should I contact my healthcare provider? · You begin to have symptoms  · Your blood sugar level is higher or lower than healthcare providers have told you it should be  · You have redness, calluses, or sores on your feet  · You have questions or concerns about your condition or care  CARE AGREEMENT:   You have the right to help plan your care  Learn about your health condition and how it may be treated  Discuss treatment options with your caregivers to decide what care you want to receive  You always have the right to refuse treatment  The above information is an  only  It is not intended as medical advice for individual conditions or treatments  Talk to your doctor, nurse or pharmacist before following any medical regimen to see if it is safe and effective for you    © 2017 [address] Information is for End User's use only and may not be sold, redistributed or otherwise used for commercial purposes  All illustrations and images included in CareNotes® are the copyrighted property of [employer]  or Adrian Oliveira  Pulmonary Embolism   WHAT YOU NEED TO KNOW:   A pulmonary embolism (PE) is the sudden blockage of a blood vessel in the lungs by an embolus  An embolus is a small piece of blood clot, fat, air, or tumor cells  The embolus cuts off the blood supply to your lungs  A pulmonary embolism can become life-threatening  WHILE YOU ARE HERE:   Informed consent  is a legal document that explains the tests, treatments, or procedures that you may need  Informed consent means you understand what will be done and can make decisions about what you want  You give your permission when you sign the consent form  You can have someone sign this form for you if you are not able to sign it  You have the right to understand your medical care in words you know  Before you sign the consent form, understand the risks and benefits of what will be done  Make sure all your questions are answered  An IV  is a small tube placed in your vein that is used to give you medicine or liquids  Medicines:   · Clot busters  are emergency medicines that work to dissolve blood clots  They cannot be used during pregnancy or in people with medical conditions that increase their risk of bleeding  · Blood thinners  help treat the PE and prevent new clots from forming  While you are taking warfarin or other blood thinners, you may bleed or bruise more easily  Tests:   · Blood tests  may show signs of the PE or how well your organs are working  · An EKG  test records your heart rhythm and how fast your heart beats  It is used to check for abnormal heart function  · A chest x-ray  may show signs of a lung infection or other damage  · A CT scan  may show the PE   You may be given contrast liquid to help your lungs show up better in the pictures  Tell the healthcare provider if you have ever had an allergic reaction to contrast liquid  · A lung scan , or V/Q scan, may show how well blood and oxygen flow in your lungs  A small amount of contrast liquid is used to study your airflow (V) and blood flow (Q)  First, you breathe in medical gas  Then, contrast liquid is injected into a vein  Pictures are taken to see how well your lungs take in oxygen  · A pulmonary angiogram  may show problems with blood flow in your heart and lungs  A catheter (long, thin, bendable tube) is placed in a vein in your neck, under your collarbone, or in your groin  The catheter is then threaded into the pulmonary artery  Contrast liquid is put into the catheter and x-rays are taken  The contrast liquid helps show your blood vessels clearly on the monitor  Tell a healthcare provider if you have ever had an allergic reaction to contrast liquid  Treatment:   · You may need extra oxygen  if your blood oxygen level is lower than it should be  You may get oxygen through a mask placed over your nose and mouth or through small tubes placed in your nostrils  Ask your healthcare provider before you take off the mask or oxygen tubing  · Pneumatic boots or pressure stockings  may be needed  The boots have an air pump that tightens and loosens different areas of the boots  The stockings are tight and put pressure on your legs  This improves blood flow and helps prevent clots  · A vena cava filter  may be placed inside your vena cava to prevent another PE  The vena cava is a large vein that brings blood from your lower body up to your heart  The filter traps blood clots and prevents them from going into your lungs  · Surgery , called a thrombectomy, may be done to remove the PE  A procedure called thrombolysis may instead be done to inject a clot buster that helps break the clot apart  RISKS:   You have a higher risk for another PE   The blood clot may block the blood flow to your heart and lungs  You may bleed more than expected if you take blood thinners  Without treatment, the PE can cause your lungs and other body organs to stop working  This can be life-threatening  CARE AGREEMENT:   You have the right to help plan your care  Learn about your health condition and how it may be treated  Discuss treatment options with your caregivers to decide what care you want to receive  You always have the right to refuse treatment  © 2017 [address] [facility] Information is for End User's use only and may not be sold, redistributed or otherwise used for commercial purposes  All illustrations and images included in CareNotes® are the copyrighted property of [employer]  or Adrian Olvieira  The above information is an  only  It is not intended as medical advice for individual conditions or treatments  Talk to your doctor, nurse or pharmacist before following any medical regimen to see if it is safe and effective for you

## 2018-10-24 NOTE — DISCHARGE SUMMARY
[facility]  Discharge- Patel Foots 1955, 66 y o  female MRN: [id number]    Unit/Bed#: -01 Encounter: [id number]    Primary Care Provider: Jon Garza PA-C   Date and time admitted to hospital: 10/22/2018  9:57 AM        * Pulmonary embolism ([facility] [address] )   Assessment & Plan    · As evidenced on CTA - multilobar PE on the right pulmonary artery   · ECHO - EF 65%, no RWMA, does have evidence of right heart strain with estimated peak pressure of the right ventricle at least 50 mmHg  · Pulmonary following,  · Follow thrombosis panel up at primaries office  · Pt to have CT abdomen/pelvis w/o contrast as outpatient to evaluate for malignancy, pt also to follow up with hematology  · Transitioned to Eliquis  · Pt to be treated with a/c for at least 6 months and follow up with hematology, she is to have repeat ECHO in 3 months as well     MARIA ESTHER (acute kidney injury) ([facility] [address] )   Assessment & Plan    · Cr  Improved to 1 28  · Baseline per review of records appears to be around 1 0  · Pt had recent CTA performed, concern for possible contrast nephropathy  · Continue oral fluid intake     Coronary artery disease involving native coronary artery of native heart without angina pectoris   Assessment & Plan    · S/p stent  · Continue ASA, plavix, BB  · Stable, denies any chest pain symptoms     Hyperkalemia   Assessment & Plan    · Resolved     Hypertensive crisis   Assessment & Plan    · POA  · This is now improved, blood pressures have remained stable  · Continue lopressor 25 mg BID  · Resume lisinopril after discharge     Wrist fracture, closed, right, initial encounter   Assessment & Plan    · S/p recent fall about 5 weeks ago   · Follow up with Orthopedics as an outpatient  This is non operative    · Stable     Type 1 diabetes mellitus with sensory neuropathy [facility])   Assessment & Plan    Lab Results   Component Value Date    HGBA1C 7 4 (H) 10/23/2018       Recent Labs      10/23/18   1614  10/23/18 2141  10/24/18   0628  10/24/18   1037   POCGLU  223*  107  99  175*       Blood Sugar Average: Last 72 hrs:  (P) 150 4  Pt is on insulin pump, continue  Consistent carb diet        Essential hypertension   Assessment & Plan    · Continue beta-blocker          Discharging Physician / Practitioner: Kizzy Turner  PCP: Montserrat Anthony PA-C  Admission Date:   Admission Orders     Ordered        10/22/18 1333  Inpatient Admission (expected length of stay for this patient is greater than two midnights)  Once             Discharge Date: 10/24/18    Resolved Problems  Date Reviewed: 10/23/2018    None          Consultations During Hospital Stay:  · Pulmonary    Procedures Performed:     · CT a of chest to rule out PE, chest x-ray, lower limb duplex study    Significant Findings / Test Results:     · Multi lobe your pulmonary emboli including distal right pulmonary artery, right popliteal DVT    Incidental Findings:   · None     Test Results Pending at Discharge (will require follow up): · Thrombosis panel     Outpatient Tests Requested:  · Patient instructed to follow up with Hematology and primary care provider and cardiologist   She will need a repeat echo in the next 3 months and a CT of abdomen and pelvis in the next 6 months    Complications:  None    Reason for Admission:  Shortness of breath    Hospital Course: Kayla Asif is a 66 y o  female patient who originally presented to the hospital on 10/22/2018 due to shortness of breath  On evaluation she was found to have pulmonary embolism as well as popliteal DVT  She was treated with heparin drip  She was seen by Pulmonary and was then transitioned to Eliquis  She was instructed to continue Eliquis for 6 months  She also underwent a lower extremity ultrasound which found a popliteal DVT  She was instructed to follow up with Hematology to rule out any malignant causes of these assumed unprovoked DVTs    She also had a MARIA ESTHER on admission, this was believed to possibly be resulted from dye used to rule out PE  She was placed on IV fluids Ayan I improved, encouraged oral intake    Please see above list of diagnoses and related plan for additional information  Condition at Discharge: good     Discharge Day Visit / Exam:     Subjective:  Denies any chest pain chest tightness shortness of breath or difficulty breathing currently  She does report that she gets dyspnea on exertion  However it has improved  She does report a cough, reports that she has had this cough for a few years but it appears to be more wet in nature  Advised that this is unlikely related to the pulmonary embolism  Encouraged to ambulate encouraged to increase oral intake of fluid to improve creatinine  Vitals: Blood Pressure: 147/67 (10/24/18 0710)  Pulse: 70 (10/24/18 0710)  Temperature: 98 4 °F (36 9 °C) (10/24/18 0710)  Temp Source: Oral (10/24/18 0710)  Respirations: 20 (10/24/18 0710)  Height: 5' 3" (160 cm) (10/22/18 1624)  Weight - Scale: 86 2 kg (190 lb 0 6 oz) (10/22/18 1624)  SpO2: 95 % (10/24/18 0710)  Exam:   Physical Exam   Constitutional: She is oriented to person, place, and time  She appears well-developed and well-nourished  HENT:   Head: Normocephalic  Eyes: Pupils are equal, round, and reactive to light  Neck: Normal range of motion  Neck supple  Cardiovascular: Normal rate and regular rhythm  Pulmonary/Chest: Effort normal and breath sounds normal    Abdominal: Soft  Bowel sounds are normal    Musculoskeletal: Normal range of motion  Neurological: She is alert and oriented to person, place, and time  Skin: Skin is warm and dry  Psychiatric: She has a normal mood and affect  Her behavior is normal  Judgment and thought content normal      Discussion with Family:  Patient is calling her     Discharge instructions/Information to patient and family:   See after visit summary for information provided to patient and family        Provisions for Follow-Up Care:  See after visit summary for information related to follow-up care and any pertinent home health orders  Disposition:     Home    For Discharges to [address] [facility]:   · Not Applicable to this Patient - Not Applicable to this Patient    Planned Readmission:  No     Discharge Statement:  I spent 30 minutes discharging the patient  This time was spent on the day of discharge  I had direct contact with the patient on the day of discharge  Greater than 50% of the total time was spent examining patient, answering all patient questions, arranging and discussing plan of care with patient as well as directly providing post-discharge instructions  Additional time then spent on discharge activities  Discharge Medications:  See after visit summary for reconciled discharge medications provided to patient and family        ** Please Note: This note has been constructed using a voice recognition system **

## 2018-10-24 NOTE — ASSESSMENT & PLAN NOTE
· POA  · This is now improved, blood pressures have remained stable  · Continue lopressor 25 mg BID  · Resume lisinopril after discharge

## 2018-10-24 NOTE — PLAN OF CARE
DISCHARGE PLANNING - CARE MANAGEMENT     Discharge to post-acute care or home with appropriate resources Completed        Potential for Falls     Patient will remain free of falls Completed

## 2018-10-24 NOTE — DISCHARGE INSTR - AVS FIRST PAGE
Please schedule an appointment with your primary care provider in the next 1-2 weeks  You will need a CT scan of her abdomen and pelvis to rule out any type of malignant cause of this blood clot    He will also need to follow-up on this thrombosis panel which can be discussed at your primary care provider's office    Please schedule appointment with her cardiologist and get a repeat echocardiogram in the next 3 months    Please drink lots of water at home to continue to flush the dye from your kidney

## 2018-10-24 NOTE — NURSING NOTE
Reviewed avs with patient and spouse  Iv removed  Site did bleed and did apply pressure and changed dressing  Belongings packed and personal items sent home with patient  Scripts given to patient  No questions or complaints  Appropriate for safe discharge

## 2018-10-25 ENCOUNTER — TRANSITIONAL CARE MANAGEMENT (OUTPATIENT)
Dept: FAMILY MEDICINE CLINIC | Facility: CLINIC | Age: 63
End: 2018-10-25

## 2018-10-25 LAB
APTT SCREEN TO CONFIRM RATIO: 0.77 RATIO (ref 0–1.4)
CONFIRM APTT/NORMAL: 55.6 SEC (ref 0–55)
F5 GENE MUT ANL BLD/T: NORMAL
LA PPP-IMP: ABNORMAL
PROT C AG ACT/NOR PPP IA: 118 % OF NORMAL (ref 60–150)
SCREEN APTT: 46.7 SEC (ref 0–51.9)
SCREEN DRVVT: 46.1 SEC (ref 0–47)
THROMBIN TIME: >150 SEC (ref 0–23)
TT IMM NP PPP: >150 SEC (ref 0–23)
TT P HPASE PPP: 18.6 SEC (ref 0–23)

## 2018-10-26 ENCOUNTER — OFFICE VISIT (OUTPATIENT)
Dept: FAMILY MEDICINE CLINIC | Facility: CLINIC | Age: 63
End: 2018-10-26
Payer: COMMERCIAL

## 2018-10-26 VITALS
WEIGHT: 191 LBS | DIASTOLIC BLOOD PRESSURE: 68 MMHG | SYSTOLIC BLOOD PRESSURE: 122 MMHG | HEIGHT: 63 IN | BODY MASS INDEX: 33.84 KG/M2 | RESPIRATION RATE: 20 BRPM | HEART RATE: 72 BPM

## 2018-10-26 DIAGNOSIS — I26.99 OTHER PULMONARY EMBOLISM WITHOUT ACUTE COR PULMONALE, UNSPECIFIED CHRONICITY (HCC): Primary | ICD-10-CM

## 2018-10-26 DIAGNOSIS — I25.10 CORONARY ARTERY DISEASE INVOLVING NATIVE CORONARY ARTERY OF NATIVE HEART WITHOUT ANGINA PECTORIS: ICD-10-CM

## 2018-10-26 DIAGNOSIS — E10.40 TYPE 1 DIABETES MELLITUS WITH SENSORY NEUROPATHY (HCC): ICD-10-CM

## 2018-10-26 DIAGNOSIS — I82.431 ACUTE DEEP VEIN THROMBOSIS (DVT) OF RIGHT POPLITEAL VEIN (HCC): ICD-10-CM

## 2018-10-26 DIAGNOSIS — N17.9 AKI (ACUTE KIDNEY INJURY) (HCC): ICD-10-CM

## 2018-10-26 LAB — F2 GENE MUT ANL BLD/T: NORMAL

## 2018-10-26 PROCEDURE — 99495 TRANSJ CARE MGMT MOD F2F 14D: CPT | Performed by: PHYSICIAN ASSISTANT

## 2018-10-26 PROCEDURE — 1111F DSCHRG MED/CURRENT MED MERGE: CPT | Performed by: PHYSICIAN ASSISTANT

## 2018-10-26 RX ORDER — INFLUENZA A VIRUS A/MICHIGAN/45/2015 X-275 (H1N1) ANTIGEN (FORMALDEHYDE INACTIVATED), INFLUENZA A VIRUS A/HONG KONG/4801/2014 X-263B (H3N2) ANTIGEN (FORMALDEHYDE INACTIVATED), INFLUENZA B VIRUS B/PHUKET/3073/2013 ANTIGEN (FORMALDEHYDE INACTIVATED), AND INFLUENZA B VIRUS B/BRISBANE/60/2008 ANTIGEN (FORMALDEHYDE INACTIVATED) 15; 15; 15; 15 UG/.5ML; UG/.5ML; UG/.5ML; UG/.5ML
INJECTION, SUSPENSION INTRAMUSCULAR
Refills: 0 | COMMUNITY
Start: 2018-10-08 | End: 2019-04-02 | Stop reason: CLARIF

## 2018-10-26 NOTE — PROGRESS NOTES
Assessment/Plan:   Patient Instructions   1  Pulmonary emboli-stable status post hospitalization-patient was started on Eliquis 5 mg twice daily  She will continue this for at least 6 months  She will continue Plavix and aspirin therapy as well  Thromboembolism inherited panel will be drawn  Patient will be referred to Hematology for further evaluation  CT of the abdomen and pelvis was recommended by the hospitalist and ordered to rule out mass secondary to unknown cause of DVT and emboli  2   DVT of the right popliteal vein-treatment as above  3   Diabetes type 2-presently stable on long-term insulin therapy  4   Coronary artery disease-stable-patient to continue follow-up with Cardiology  Echocardiogram for 3 months was recommended and will be ordered  5   Acute kidney injury-patient was dehydrated in the hospital and had a small blip in her creatinine  Repeat test will be ordered      No problem-specific Assessment & Plan notes found for this encounter  Diagnoses and all orders for this visit:    Other pulmonary embolism without acute cor pulmonale, unspecified chronicity ([facility] [place] [address] )  -     Ambulatory referral to Hematology / Oncology; Future  -     CT abdomen pelvis w contrast; Future  -     CBC and differential  -     Comprehensive metabolic panel  -     Thrombosis Panel; Future  -     apixaban (ELIQUIS) 5 mg; Take 1 tablet (5 mg total) by mouth 2 (two) times a day    Acute deep vein thrombosis (DVT) of right popliteal vein ([facility] [place] [address] )  -     Ambulatory referral to Hematology / Oncology; Future  -     CT abdomen pelvis w contrast; Future  -     CBC and differential  -     Comprehensive metabolic panel  -     Thrombosis Panel; Future  -     apixaban (ELIQUIS) 5 mg; Take 1 tablet (5 mg total) by mouth 2 (two) times a day    Coronary artery disease involving native coronary artery of native heart without angina pectoris  -     Echo complete with contrast if indicated;  Future  -     CBC and differential  - Comprehensive metabolic panel  -     TSH, 3rd generation with Free T4 reflex    Type 1 diabetes mellitus with sensory neuropathy (HCC)  -     CBC and differential  -     Comprehensive metabolic panel  -     TSH, 3rd generation with Free T4 reflex    MARIA ESTHER (acute kidney injury) ([facility] [address] )  -     CT abdomen pelvis w contrast; Future    Other orders  -     FLUZONE QUADRIVALENT 0 5 ML TAMEKA; TO BE ADMINISTERED BY PHARMACIST FOR IMMUNIZATION         Subjective:     Patient ID: Timothy Hays is a 66 y o  female  HPI:  This is a 62-year-old female who presents to the office for follow-up of admission to the hospital on the 20 second of October, 2018, 4 days ago  She was diagnosed with acute right pulmonary emboli and DVT of the right popliteal vein after presenting with increasing severe shortness of breath  She does have a history of coronary artery disease as well  She had some evidence of right-sided heart strain and repeat echocardiogram in 3 months was recommended  Patient does follow with Cardiology at [facility] regularly for the past 17 years  She was discharged on Eliquis 5 mg twice daily as well as continued Plavix and aspirin  She needs prescriptions for the Eliquis to her [address] [facility]  Also further evaluation for inherited causes of thromboembolism more recommended and follow-up with Hematology  Patient states that over the summer she was on a long car trip and attributed this as possibly causing her DVT however the hospitalist wanted to rule out any underlying cancers cause and recommend abdominal and pelvic CT scan  Review of Systems   Constitutional: Negative for chills, fatigue and fever  HENT: Negative for congestion, ear pain and sinus pressure  Eyes: Negative for visual disturbance  Respiratory: Positive for shortness of breath  Negative for cough and chest tightness  Cardiovascular: Negative for chest pain and palpitations     Gastrointestinal: Negative for diarrhea, nausea and vomiting  Endocrine: Negative for polyuria  Genitourinary: Negative for dysuria and frequency  Musculoskeletal: Negative for arthralgias and myalgias  Skin: Negative for pallor and rash  Neurological: Negative for dizziness, weakness, light-headedness, numbness and headaches  Psychiatric/Behavioral: Negative for agitation, behavioral problems and sleep disturbance  All other systems reviewed and are negative  Objective:     Physical Exam   Constitutional: She is oriented to person, place, and time  She appears well-developed and well-nourished  No distress  HENT:   Head: Normocephalic and atraumatic  Right Ear: External ear normal    Left Ear: External ear normal    Nose: Nose normal    Mouth/Throat: Oropharynx is clear and moist  No oropharyngeal exudate  Eyes: Pupils are equal, round, and reactive to light  Conjunctivae and EOM are normal    Neck: Normal range of motion  Neck supple  No tracheal deviation present  No thyromegaly present  Cardiovascular: Normal rate, regular rhythm and normal heart sounds  Exam reveals no friction rub  No murmur heard  Pulmonary/Chest: Effort normal and breath sounds normal  No respiratory distress  She has no wheezes  She has no rales  Abdominal: Soft  Bowel sounds are normal  She exhibits no distension  There is no tenderness  There is no rebound and no guarding  Musculoskeletal: Normal range of motion  She exhibits no edema or tenderness  Lymphadenopathy:     She has no cervical adenopathy  Neurological: She is alert and oriented to person, place, and time  No cranial nerve deficit  Coordination normal    Skin: Skin is warm and dry  No rash noted  No erythema  Psychiatric: She has a normal mood and affect  Her behavior is normal  Thought content normal    Nursing note and vitals reviewed          Vitals:    10/26/18 1401   BP: 122/68   BP Location: Left arm   Patient Position: Sitting   Cuff Size: Standard   Pulse: 72   Resp: 20 Weight: 86 6 kg (191 lb)   Height: 5' 3" (1 6 m)       Transitional Care Management Review: Timothy Hays is a 66 y o  female here for TCM follow up       During the TCM phone call patient stated:    Date and time hospital follow up call was made:  10/25/2018 11:02 AM  Hospital care reviewed:  Records reviewed  Patient was hopsitalized at:  [facility]  Date of admission:  10/22/18  Date of discharge:  10/24/18  Diagnosis:  pulmonary embolism and dvt  Disposition:  Home  Were the patients medicaitons reviewed and updated:  Yes  Current symptoms:  None  Post hospital issues:  None  Should patient be enrolled in anticoag monitoring?:  No (Comment: pt on eliquis)  Scheduled for follow up?:  Yes  Did you obtain your prescribed medications:  Yes  Do you need help managing your perscriptions or medications:  No  Is transportation to your appointments needed:  No  I have advised the patient to call PCP with any new or worsening symptoms (please type in name along with any credentials):  Kimberlee Mendenhall LPN         Vitals:    10/26/18 1401   BP: 122/68   BP Location: Left arm   Patient Position: Sitting   Cuff Size: Standard   Pulse: 72   Resp: 20   Weight: 86 6 kg (191 lb)   Height: 5' 3" (1 6 m)       Darin Overton PA-C

## 2018-10-26 NOTE — PATIENT INSTRUCTIONS
1   Pulmonary emboli-stable status post hospitalization-patient was started on Eliquis 5 mg twice daily  She will continue this for at least 6 months  She will continue Plavix and aspirin therapy as well  Thromboembolism inherited panel will be drawn  Patient will be referred to Hematology for further evaluation  CT of the abdomen and pelvis was recommended by the hospitalist and ordered to rule out mass secondary to unknown cause of DVT and emboli  2   DVT of the right popliteal vein-treatment as above  3   Diabetes type 2-presently stable on long-term insulin therapy  4   Coronary artery disease-stable-patient to continue follow-up with Cardiology  Echocardiogram for 3 months was recommended and will be ordered  5   Acute kidney injury-patient was dehydrated in the hospital and had a small blip in her creatinine    Repeat test will be ordered

## 2018-10-27 LAB
BACTERIA BLD CULT: NORMAL
BACTERIA BLD CULT: NORMAL

## 2018-11-02 LAB
LEFT EYE DIABETIC RETINOPATHY: NORMAL
RIGHT EYE DIABETIC RETINOPATHY: NORMAL
SEVERITY (EYE EXAM): NORMAL

## 2018-11-02 PROCEDURE — 3072F LOW RISK FOR RETINOPATHY: CPT | Performed by: FAMILY MEDICINE

## 2018-11-05 ENCOUNTER — TELEPHONE (OUTPATIENT)
Dept: FAMILY MEDICINE CLINIC | Facility: CLINIC | Age: 63
End: 2018-11-05

## 2018-11-05 NOTE — TELEPHONE ENCOUNTER
LMOM for Josefina or Cristi informing them of Javan's response  Suggested they call back if they have any additional questions

## 2018-11-05 NOTE — TELEPHONE ENCOUNTER
OMKAR called  They dorian her blood on Saturday  They do not have a Thrombosis Panel  Needs specific tests you want    Call back Josefina or Cristi at [phone]

## 2018-11-05 NOTE — TELEPHONE ENCOUNTER
I would like factor V Leiden mutation analysis, MTHFR mutation analysis, protein S, protein C, and anti thrombin III

## 2018-11-18 DIAGNOSIS — I25.10 CORONARY ARTERY DISEASE INVOLVING NATIVE CORONARY ARTERY OF NATIVE HEART WITHOUT ANGINA PECTORIS: ICD-10-CM

## 2018-11-18 RX ORDER — SIMVASTATIN 40 MG
TABLET ORAL
Qty: 90 TABLET | Refills: 2 | Status: SHIPPED | OUTPATIENT
Start: 2018-11-18 | End: 2019-08-15 | Stop reason: SDUPTHER

## 2018-11-19 ENCOUNTER — HOSPITAL ENCOUNTER (OUTPATIENT)
Dept: CT IMAGING | Facility: HOSPITAL | Age: 63
Discharge: HOME | End: 2018-11-19
Payer: COMMERCIAL

## 2018-11-19 DIAGNOSIS — I26.99 OTHER PULMONARY EMBOLISM WITHOUT ACUTE COR PULMONALE, UNSPECIFIED CHRONICITY (HCC): ICD-10-CM

## 2018-11-19 DIAGNOSIS — N17.9 AKI (ACUTE KIDNEY INJURY) (HCC): ICD-10-CM

## 2018-11-19 DIAGNOSIS — I82.431 ACUTE DEEP VEIN THROMBOSIS (DVT) OF RIGHT POPLITEAL VEIN (HCC): ICD-10-CM

## 2018-11-19 PROCEDURE — 74177 CT ABD & PELVIS W/CONTRAST: CPT

## 2018-11-19 RX ADMIN — IODIXANOL 100 ML: 320 INJECTION, SOLUTION INTRAVASCULAR at 07:39

## 2018-11-21 ENCOUNTER — TELEPHONE (OUTPATIENT)
Dept: FAMILY MEDICINE CLINIC | Facility: CLINIC | Age: 63
End: 2018-11-21

## 2018-11-21 DIAGNOSIS — R93.89 ABNORMAL FINDING ON CT SCAN: Primary | ICD-10-CM

## 2018-11-21 DIAGNOSIS — K63.9 COLON WALL THICKENING: ICD-10-CM

## 2018-11-21 DIAGNOSIS — Z12.11 ENCOUNTER FOR SCREENING COLONOSCOPY: ICD-10-CM

## 2018-11-21 NOTE — TELEPHONE ENCOUNTER
MS, Received a call from [facility] with report of pts Ct of Abdomen and Pelvis,Report in Epic please review this

## 2018-11-27 ENCOUNTER — HOSPITAL ENCOUNTER (OUTPATIENT)
Dept: ULTRASOUND IMAGING | Facility: HOSPITAL | Age: 63
Discharge: HOME | End: 2018-11-27
Attending: SURGERY
Payer: COMMERCIAL

## 2018-11-27 DIAGNOSIS — I65.21 OCCLUSION AND STENOSIS OF RIGHT CAROTID ARTERY: ICD-10-CM

## 2018-11-27 PROCEDURE — 93880 EXTRACRANIAL BILAT STUDY: CPT

## 2018-11-28 DIAGNOSIS — I25.10 CORONARY ARTERY DISEASE INVOLVING NATIVE CORONARY ARTERY OF NATIVE HEART WITHOUT ANGINA PECTORIS: ICD-10-CM

## 2018-11-28 PROCEDURE — 93880 EXTRACRANIAL BILAT STUDY: CPT | Performed by: SURGERY

## 2018-11-29 ENCOUNTER — OFFICE VISIT (OUTPATIENT)
Dept: CARDIOLOGY CLINIC | Facility: CLINIC | Age: 63
End: 2018-11-29
Payer: COMMERCIAL

## 2018-11-29 VITALS
DIASTOLIC BLOOD PRESSURE: 70 MMHG | SYSTOLIC BLOOD PRESSURE: 130 MMHG | BODY MASS INDEX: 31.65 KG/M2 | WEIGHT: 190 LBS | HEART RATE: 66 BPM | HEIGHT: 65 IN

## 2018-11-29 DIAGNOSIS — I27.29 PULMONARY HYPERTENSION DUE TO THROMBOEMBOLISM (HCC): ICD-10-CM

## 2018-11-29 DIAGNOSIS — I26.99 PULMONARY HYPERTENSION DUE TO THROMBOEMBOLISM (HCC): ICD-10-CM

## 2018-11-29 DIAGNOSIS — I25.10 CORONARY ARTERY DISEASE INVOLVING NATIVE CORONARY ARTERY OF NATIVE HEART WITHOUT ANGINA PECTORIS: Primary | ICD-10-CM

## 2018-11-29 DIAGNOSIS — E78.5 HYPERLIPIDEMIA, UNSPECIFIED HYPERLIPIDEMIA TYPE: ICD-10-CM

## 2018-11-29 DIAGNOSIS — I10 ESSENTIAL HYPERTENSION: ICD-10-CM

## 2018-11-29 DIAGNOSIS — I65.23 ASYMPTOMATIC BILATERAL CAROTID ARTERY STENOSIS: ICD-10-CM

## 2018-11-29 PROCEDURE — 99214 OFFICE O/P EST MOD 30 MIN: CPT | Performed by: INTERNAL MEDICINE

## 2018-11-29 NOTE — PROGRESS NOTES
Cardiology Follow Up    David Benavides  1955  [id number]  [facility] [address]    1  Coronary artery disease involving native coronary artery of native heart without angina pectoris     2  Asymptomatic bilateral carotid artery stenosis     3  Essential hypertension     4  Hyperlipidemia, unspecified hyperlipidemia type     5  Pulmonary hypertension due to thromboembolism [facility])         Interval History:   Cardiology follow-up  Recent hospitalization with multilobar right-sided pulmonary embolism  Echocardiogram revealed normal left systolic function, there was no right ventricular strain but there was evidence of pulmonary hypertension with estimated systolic pulmonary pressures in the 50 range  Since discharge, she has been doing well  She she notices easy bruisability, she was continued on triple therapy  There was no cardiology involvement in that decision      Patient Active Problem List   Diagnosis    Coronary artery disease involving native coronary artery of native heart without angina pectoris    Hyperlipidemia    Asymptomatic carotid artery stenosis    Essential hypertension    Insulin pump status    Old myocardial infarction    Peripheral vascular disease (HCC)    Type 1 diabetes mellitus with sensory neuropathy ([facility])    Sore throat    Acute ear pain, bilateral    Urinary tract infection without hematuria    Pulmonary embolism ([facility])    Wrist fracture, closed, right, initial encounter    Hypertensive crisis    Hyperkalemia    MARIA ESTHER (acute kidney injury) ([facility] [address] )    Acute deep vein thrombosis (DVT) of right popliteal vein (HCC)    Pulmonary hypertension due to thromboembolism [facility])     Past Medical History:   Diagnosis Date    Acute myocardial infarction [facility])     Last Assessed: 11/7/2012    Carotid artery occlusion     Coronary artery disease  Diabetes mellitus ([facility] [address] )     Ectopic pregnancy     Onset:     Hyperlipidemia     Hypertension      Social History     Social History    Marital status: /Civil Union     Spouse name: N/A    Number of children: N/A    Years of education: N/A     Occupational History    Not on file  Social History Main Topics    Smoking status: Former Smoker    Smokeless tobacco: Never Used      Comment: Former smoker per [employer]    Alcohol use Yes      Comment: social    Drug use: No    Sexual activity: Not on file     Other Topics Concern    Not on file     Social History Narrative    No narrative on file      Family History   Problem Relation Age of Onset    Arrhythmia Father         Myocardial Infarction Arrhythmias    Prostate cancer Father     Other Family         Coronary Arterial Triple Bypass Graft     Past Surgical History:   Procedure Laterality Date     SECTION      Onset:     CLOSED REDUCTION METATARSAL FRACTURE Left     5th toe;  Onset: 2012    CORONARY ANGIOPLASTY      CORONARY ANGIOPLASTY WITH STENT PLACEMENT      Onset:     EYE SURGERY Bilateral     cateract surg    TUBAL LIGATION      Onset:        Current Outpatient Prescriptions:     apixaban (ELIQUIS) 5 mg, Take 1 tablet (5 mg total) by mouth 2 (two) times a day, Disp: 180 tablet, Rfl: 3    aspirin 81 MG tablet, Take 1 tablet by mouth daily, Disp: , Rfl:     DIANE CONTOUR NEXT TEST test strip, , Disp: , Rfl:     clopidogrel (PLAVIX) 75 mg tablet, TAKE 1 TABLET DAILY, Disp: 90 tablet, Rfl: 0    FLUZONE QUADRIVALENT 0 5 ML TAMEKA, TO BE ADMINISTERED BY PHARMACIST FOR IMMUNIZATION, Disp: , Rfl: 0    Glucose Blood (IGLUCOSE TEST STRIPS VI), Test 5 times daily, e10 9, pump therapy, Disp: , Rfl:     insulin lispro (HUMALOG) 100 units/mL injection, Inject under the skin  , Disp: , Rfl:     Insulin Syringe-Needle U-100 (INSULIN SYRINGE 1CC/31GX5/16") 31G X 5/16" 1 ML MISC, Inject 2 times per day, Disp: , Rfl:     lisinopril (ZESTRIL) 10 mg tablet, TAKE 1 TABLET DAILY, Disp: 90 tablet, Rfl: 2    metoprolol tartrate (LOPRESSOR) 25 mg tablet, TAKE 1 TABLET TWICE A DAY, Disp: 180 tablet, Rfl: 0    niacin (NIASPAN) 500 mg CR tablet, Take 1 tablet by mouth daily, Disp: , Rfl:     nitroglycerin (NITROLINGUAL) 0 4 mg/spray spray, Nitroglycerin 400 MCG/SPRAY Translingual Aerosol Solution; PRN CHEST PAIN; 90; 3; 18-Feb-2016; 18-Feb-2016; Jaimee Seaman;  Active, Disp: , Rfl:     simvastatin (ZOCOR) 40 mg tablet, TAKE 1 TABLET DAILY, Disp: 90 tablet, Rfl: 2    apixaban (ELIQUIS) 5 mg, Take 2 tablets (10 mg total) by mouth 2 (two) times a day 10 mg twice a day completed on October 31st, then 5 mg twice a day for the next 6 months (Patient not taking: Reported on 11/29/2018 ), Disp: 150 tablet, Rfl: 0  Allergies   Allergen Reactions    Codeine Nausea Only    Other        Labs:  Orders Only on 11/02/2018   Component Date Value    Severity 11/02/2018 Alert     Right Eye Diabetic Retin* 11/02/2018 Mild     Left Eye Diabetic Retino* 11/02/2018 Mild    Admission on 10/22/2018, Discharged on 10/24/2018   Component Date Value    WBC 10/22/2018 8 32     RBC 10/22/2018 4 44     Hemoglobin 10/22/2018 13 7     Hematocrit 10/22/2018 40 7     MCV 10/22/2018 92     MCH 10/22/2018 30 9     MCHC 10/22/2018 33 7     RDW 10/22/2018 12 9     MPV 10/22/2018 10 3     Platelets 15/21/8907 273     nRBC 10/22/2018 0     Neutrophils Relative 10/22/2018 68     Immat GRANS % 10/22/2018 1     Lymphocytes Relative 10/22/2018 17     Monocytes Relative 10/22/2018 10     Eosinophils Relative 10/22/2018 3     Basophils Relative 10/22/2018 1     Neutrophils Absolute 10/22/2018 5 71     Immature Grans Absolute 10/22/2018 0 04     Lymphocytes Absolute 10/22/2018 1 41     Monocytes Absolute 10/22/2018 0 82     Eosinophils Absolute 10/22/2018 0 23     Basophils Absolute 10/22/2018 0 11*    Protime 10/22/2018 13 3     INR 10/22/2018 1 02     PTT 10/22/2018 27     Sodium 10/22/2018 141     Potassium 10/22/2018 5 7*    Chloride 10/22/2018 106     CO2 10/22/2018 24     ANION GAP 10/22/2018 11     BUN 10/22/2018 23     Creatinine 10/22/2018 1 11     Glucose 10/22/2018 195*    Calcium 10/22/2018 9 3     eGFR 10/22/2018 54     Total Bilirubin 10/22/2018 0 30     Bilirubin, Direct 10/22/2018 0 13     Alkaline Phosphatase 10/22/2018 139*    AST 10/22/2018 17     ALT 10/22/2018 24     Total Protein 10/22/2018 7 7     Albumin 10/22/2018 3 5     TSH 3RD GENERATON 10/22/2018 3 427     Blood Culture 10/22/2018 No Growth After 5 Days   Blood Culture 10/22/2018 No Growth After 5 Days       Color, UA 10/22/2018 Yellow     Clarity, UA 10/22/2018 Clear     Specific Gravity, UA 10/22/2018 1 010     pH, UA 10/22/2018 7 0     Leukocytes, UA 10/22/2018 Negative     Nitrite, UA 10/22/2018 Negative     Protein, UA 10/22/2018 30 (1+)*    Glucose, UA 10/22/2018 250 (1/4%)*    Ketones, UA 10/22/2018 Negative     Urobilinogen, UA 10/22/2018 0 2     Bilirubin, UA 10/22/2018 Negative     Blood, UA 10/22/2018 Negative     Phosphorus 10/22/2018 3 1     Magnesium 10/22/2018 2 0     LACTIC ACID 10/22/2018 1 2     NT-proBNP 10/22/2018 194*    Troponin I 10/22/2018 <0 02     D-Dimer, Quant 10/22/2018 >67630*    Troponin I 10/22/2018 <0 02     RBC, UA 10/22/2018 None Seen     WBC, UA 10/22/2018 1-2*    Epithelial Cells 10/22/2018 Occasional     Bacteria, UA 10/22/2018 None Seen     AntiThrombIN III Activity 10/22/2018 103     Anticardiolipin IgA 10/22/2018 <9     Anticardiolipin IgG 10/22/2018 <9     Anticardiolipin IgM 10/22/2018 <9     Factor V Leiden 10/22/2018 Comment     PTT Lupus Anticoagulant 10/22/2018 46 7     Dilute Viper Venom Time 10/22/2018 46 1     DILUTE PROTHROMBIN TIME(* 10/22/2018 55 6*    THROMBIN TIME (DRVW) 10/22/2018 >150 0*    DPT CONFIRM RATIO 10/22/2018 0 77     LUPUS REFLEX INTERPRETAT* 10/22/2018 Comment:     Protein C Activity 10/22/2018 118     Protein S Activity 10/22/2018 20*    Protein S Ag, Total 10/22/2018 64     Protein S Ag, Free 10/22/2018 <25*    Prothrombin Mutation 10/22/2018 Comment     Beta-2 Glyco 1 IgG 10/22/2018 <9     Beta-2 Glyco 1 IgA 10/22/2018 <9     Beta-2 Glyco 1 IgM 10/22/2018 <9     Troponin I 10/22/2018 <0 02     Sodium 10/22/2018 139     Potassium 10/22/2018 5 3     Chloride 10/22/2018 105     CO2 10/22/2018 24     ANION GAP 10/22/2018 10     BUN 10/22/2018 22     Creatinine 10/22/2018 1 15     Glucose 10/22/2018 168*    Calcium 10/22/2018 9 2     eGFR 10/22/2018 51     PTT 10/22/2018 >210*    PTT 10/23/2018 >210*    Ventricular Rate 10/22/2018 93     Atrial Rate 10/22/2018 93     [place] Interval 10/22/2018 148     QRSD Interval 10/22/2018 72     QT Interval 10/22/2018 312     QTC Interval 10/22/2018 387     P Axis 10/22/2018 65     QRS Axis 10/22/2018 57     T Wave Axis 10/22/2018 67     WBC 10/23/2018 7 85     RBC 10/23/2018 3 78*    Hemoglobin 10/23/2018 11 8     Hematocrit 10/23/2018 35 4     MCV 10/23/2018 94     MCH 10/23/2018 31 2     MCHC 10/23/2018 33 3     RDW 10/23/2018 13 3     Platelets 37/88/9126 254     MPV 10/23/2018 10 2     Sodium 10/23/2018 141     Potassium 10/23/2018 4 5     Chloride 10/23/2018 107     CO2 10/23/2018 26     ANION GAP 10/23/2018 8     BUN 10/23/2018 24     Creatinine 10/23/2018 1 35*    Glucose 10/23/2018 158*    Calcium 10/23/2018 8 7     eGFR 10/23/2018 42     Magnesium 10/23/2018 2 0     Hemoglobin A1C 10/23/2018 7 4*    EAG 10/23/2018 166     Protime 10/23/2018 14 6*    INR 10/23/2018 1 15     PTT 10/23/2018 98*    POC Glucose 10/23/2018 148*    POC Glucose 10/23/2018 223*    POC Glucose 10/23/2018 107     Sodium 10/24/2018 140     Potassium 10/24/2018 4 4     Chloride 10/24/2018 107     CO2 10/24/2018 23     ANION GAP 10/24/2018 10     BUN 10/24/2018 30*    Creatinine 10/24/2018 1 28     Glucose 10/24/2018 120     Calcium 10/24/2018 8 6     eGFR 10/24/2018 45     POC Glucose 10/24/2018 99     POC Glucose 10/24/2018 175*    THROMBIN TIME MIX-LA 10/22/2018 >150 0*    THROMBIN NEUTRALIZATION-* 10/22/2018 18 6      Imaging: Vas Carotid Complete Study    Result Date: 11/28/2018  Narrative:  [facility] REPORT CLINICAL: Indications:  Carotid disease w/o CVA [I65 29]  6 month surveillance of carotid artery disease  Patient is asymptomatic at this time  Operative History: 2003-01-01 Cardiac angioplasty with stent placement Risk Factors The patient has history of HTN, Diabetes (IDDM adult) and HLD  FINDINGS:  Segment      Rig                     Left                      PSV  EDV (cm/s)  Ratio  PSV  EDV (cm/s)  Ratio  Dist  ICA     97          14   1 36   84          22   1 14  Mid  ICA      89          19   1 24  135          25   1 83  Prox  ICA    226          59   3 17  122          23   1 66  Dist CCA      55          16          67          15         Mid CCA       71          17   1 08   74          19   1 17  Prox CCA      66           9          63          16         Ext Carotid  307          24   4 30  213          23   2 89  Prox Vert     43          11          65          13         Subclavian    65           0         129           0            CONCLUSION: Impression RIGHT: There is >70% stenosis noted in the internal carotid artery  Plaque is heterogenous and irregular  Vertebral artery flow is antegrade  There are elevated velocities visualized in the proximal external carotid artery  There is no significant subclavian artery disease  LEFT: There is 50-69% stenosis noted in the internal carotid artery  Plaque is heterogenous and irregular  Vertebral artery flow is antegrade  There is no significant subclavian artery disease  Compared to previous study on 4/12/2018, there is no significant change   Recommend repeat testing in 6month as per protocol unless otherwise indicated  Internal carotid artery stenosis determination by consensus criteria from: Ximena Mabry et al  Carotid Artery Stenosis: Gray-Scale and Doppler US Diagnosis - Society of Radiologists in [address] [place] [address], Radiology 2003; [phone]  SIGNATURE: Electronically Signed by: Nick Fernández MD, RPVI on 2018-11-28 04:43:37 PM    Ct Abdomen Pelvis W Contrast    Result Date: 11/21/2018  Narrative: CT ABDOMEN AND PELVIS WITH IV CONTRAST INDICATION:   I26 99: Other pulmonary embolism without acute cor pulmonale I82 431: Acute embolism and thrombosis of right popliteal vein N17 9: Acute kidney failure, unspecified  COMPARISON:  None  TECHNIQUE:  CT examination of the abdomen and pelvis was performed  Axial, sagittal, and coronal 2D reformatted images were created from the source data and submitted for interpretation  Radiation dose length product (DLP) for this visit:  714 mGy-cm   This examination, like all CT scans performed in the [facility], was performed utilizing techniques to minimize radiation dose exposure, including the use of iterative reconstruction and automated exposure control  IV Contrast:  100 mL of iodixanol (VISIPAQUE) Enteric Contrast:  Enteric contrast was not administered  FINDINGS: ABDOMEN LOWER CHEST:  A right lower lobe lung nodule seen, measuring 10 mm  This is unchanged from the previous study of October 22, 2018 and June 16, 2016 LIVER/BILIARY TREE: No discrete liver lesions seen GALLBLADDER:  No calcified gallstones  No pericholecystic inflammatory change  SPLEEN:  Unremarkable  PANCREAS:  Unremarkable  ADRENAL GLANDS:  Unremarkable  KIDNEYS/URETERS:  Prominent right renal pelvis and right ureter without any obstructing calculus STOMACH AND BOWEL:  No abnormal dilation of small bowel loops seen Small hiatal hernia seen    Mild thickening of the terminal ileum seen with the submucosal hypodensity related of fat deposition Small focal area of colonic thickening seen in the ascending colon, seen in image 46 series 2 APPENDIX:  No findings to suggest appendicitis  ABDOMINOPELVIC CAVITY:  No ascites or free intraperitoneal air  No lymphadenopathy  VESSELS:  The celiac trunk, SMA, STACY are patent Iliac vessels are patent PELVIS REPRODUCTIVE ORGANS:  The uterus appears unremarkable There is no pelvic adnexal mass seen URINARY BLADDER:  Small focus of calcification seen in the anterior aspect of the urinary bladder without associated the focal wall thickening ABDOMINAL WALL/INGUINAL REGIONS:  Unremarkable  OSSEOUS STRUCTURES:  No acute compression collapse of the vertebra seen No gross lytic lesion seen Severe left foraminal narrowing and severe right foraminal narrowing seen at L5-S1     Impression: No large intra-abdominal mass seen A small focal area of  colonic thickening seen in image 46 series 2  This may be due to peristalsis or due to focal lesion  Correlation with recent colonoscopy suggested Thickening of the terminal ileum noted with submucosal hypodensity due to deposition of fat     This may be sequela of chronic inflammation or related to patient's body habitus Right lower lobe lung nodule, seen in image 4 series 2, stable since the previous study of June 16, 2016 for period of more than 2 years Mild dilation of the right renal pelvis and right Without any obstructing calculus  Small focus of calcification seen in the anterior aspect measuring about without any associated focal wall thickening, nonspecific The study was marked in EPIC for significant notification  Workstation performed: [id number]       Review of Systems:  Review of Systems   Constitutional: Negative for activity change and fever  HENT: Negative for nosebleeds  Eyes: Negative for visual disturbance  Respiratory: Negative for apnea, shortness of breath, wheezing and stridor  Cardiovascular: Negative for chest pain, palpitations and leg swelling  Gastrointestinal: Negative for abdominal pain and blood in stool  Endocrine: Negative for cold intolerance  Genitourinary: Negative for difficulty urinating and hematuria  Musculoskeletal: Negative for arthralgias and myalgias  Skin: Negative for color change and pallor  Allergic/Immunologic: Negative for immunocompromised state  Neurological: Negative for syncope  Hematological: Bruises/bleeds easily  Psychiatric/Behavioral: Negative for sleep disturbance  Physical Exam:  Physical Exam   Constitutional: She is oriented to person, place, and time  She appears well-developed  No distress  Eyes: Right conjunctiva has a hemorrhage (Left eye)  No scleral icterus  Neck: No JVD present  Cardiovascular: Normal rate, regular rhythm, normal heart sounds and intact distal pulses  Exam reveals no gallop and no friction rub  No murmur heard  Pulmonary/Chest: Effort normal and breath sounds normal  No respiratory distress  She has no wheezes  She has no rales  She exhibits no tenderness  Neurological: She is alert and oriented to person, place, and time  Skin: Skin is warm and dry  She is not diaphoretic  Psychiatric: She has a normal mood and affect  Discussion/Summary:  Coronary artery disease, three-vessel  Cardiac catheterization 2009 for stress test suggested ischemia revealed 60% lad lesion, 1st diagonal with 50% lesion, 50% long circumflex lesion  Left ventricular systolic function  Follow-up stress test in 2014 suggested no ischemia interestingly so, stress test echocardiogram this year sub maximal there was no EKG or echocardiographic criteria suggest ischemia there was hypertensive response to exercise  Overall stable favor no changes  Asked her to withhold Plavix therapy while she is on full anticoagulation with apixaban  Peripheral vascular disease complex, recent carotid duplex revealed greater than 70% on the right and 50-60 and 9% on the left which is unchanged  CTA earlier this year revealed greater than 70% right internal carotid artery stenosis and 50% on the left  She does have significant intracranial cavernous disease in the carotids  And right vertebral disease as well which is unchanged from previous CTA  Pulmonary embolism with acute pulmonary hypertension, follow-up echocardiogram was ordered  To assess improvement of pulmonary artery pressures

## 2018-12-06 ENCOUNTER — OFFICE VISIT (OUTPATIENT)
Dept: HEMATOLOGY ONCOLOGY | Facility: CLINIC | Age: 63
End: 2018-12-06
Payer: COMMERCIAL

## 2018-12-06 VITALS
RESPIRATION RATE: 16 BRPM | HEART RATE: 60 BPM | OXYGEN SATURATION: 99 % | WEIGHT: 192 LBS | SYSTOLIC BLOOD PRESSURE: 124 MMHG | HEIGHT: 65 IN | DIASTOLIC BLOOD PRESSURE: 58 MMHG | BODY MASS INDEX: 31.99 KG/M2

## 2018-12-06 DIAGNOSIS — I82.431 ACUTE DEEP VEIN THROMBOSIS (DVT) OF RIGHT POPLITEAL VEIN (HCC): Primary | ICD-10-CM

## 2018-12-06 PROCEDURE — 99243 OFF/OP CNSLTJ NEW/EST LOW 30: CPT | Performed by: INTERNAL MEDICINE

## 2018-12-06 NOTE — PROGRESS NOTES
HEMATOLOGY / [address] NOTE    Primary Care Provider: Arlene Abraham PA-C  Referring Provider: Héctor Sharp  MRN: [id number]  : 1955    Reason for Encounter:  Chief Complaint   Patient presents with   [address] [place] [address] Patient Consult - Hx of PE's         Hematology / Oncology History:     Macario Garcia is a 66 y o  female who came in for follow up      1, 10/2018:  newly developed a extremity she small subsegmental PE , and  right lower extremity DVT  -presented with dyspnea,  has been on Eliquis      2, protein S deficiency  - for the S activity 26%  This was done in November, while patient is on Eliquis  3, per patient, remote history of right lower extremity DVT in angle area  Presented with leg swelling  No treatment, as per the family physician at that time  Interval History:     :  The patient is a 46-year-old female, with history of coronary artery disease, hypertension, hyperlipidemia, peripheral vascular disease, diabetes, recently presented with dyspnea and found having DVT and PE, hospitalized and discharged, came in to follow with Hematology as outpatient  Confirmed above history, summarized as above, has been tolerated Eliquis okay no bleeding  Otherwise patient has no history of malignancy, no recent trauma injury surgeries, no chronic inflammation infection, no autoimmune disease, reported while had a history of PE in 70s  Patient not on hormone replacement therapy, no smoker  Reported in August had a long travel from [place] to [place] and then to [place]  Again nonsmoker, working as a teacher for [employer]           Problem list:     Patient Active Problem List   Diagnosis    Coronary artery disease involving native coronary artery of native heart without angina pectoris    Hyperlipidemia    Asymptomatic carotid artery stenosis    Essential hypertension    Insulin pump status    Old myocardial infarction    Peripheral vascular disease ([facility] [address] )  Type 1 diabetes mellitus with sensory neuropathy (HCC)    Sore throat    Acute ear pain, bilateral    Urinary tract infection without hematuria    Pulmonary embolism (HCC)    Wrist fracture, closed, right, initial encounter    Hypertensive crisis    Hyperkalemia    MARIA ESTHER (acute kidney injury) ([facility] [address] )    Acute deep vein thrombosis (DVT) of right popliteal vein (HCC)    Pulmonary hypertension due to thromboembolism ([facility] [address] )       Assessment / Plan:         1  Acute deep vein thrombosis (DVT) of right popliteal vein (HCC)   - contributing factor including travel in summer time, no other risk factor be identified  Thrombosis panel showed protein S deficiency, 26% is considered very low  Although this could be a false positive while patient is on blood thinner and developed recent clot, at this level I believe this is a real protein S deficiency  Will however repeat a protein S level 1 patient of blood thinner  Decided to continue Eliquis for 6 months and then stopped in April, a week later will repeat another thrombosis panel  Patient will then restart Eliquis until be evaluated by me  - Thrombosis Panel; Future      2, PE   - dyspnea symptoms resolved in 1 week after taking Eliquis  45    minutes were spent face to face with patient with greater than 50% of the time spent in counseling or coordination of care including discussions of treatment instructions  All of the patient's questions were answered to their satisfactory during this discussion  Advised pt to call if there is any further questions  PHYSICIAL EXAMINATION:       Vital Signs:   [unfilled]  Body mass index is 31 95 kg/m²  Body surface area is 1 94 meters squared  GEN: Alert, awake oriented x3, in no acute distress  HEENT- No pallor, icterus, cyanosis, no oral mucosal lesions,   LAD - no palpable cervical, clavicle, axillary, inguinal LAD  Heart- normal S1 S2, regular rate and rhythm, No murmur, rubs     Lungs- decreased breathing sound bilateral    Abdomen- soft, Non tender, bowel sounds present  Extremities- No cyanosis, clubbing, edema  Neuro- No focal neurological deficit           PAST MEDICAL HISTORY:   has a past medical history of Acute myocardial infarction ([facility] [address] ); Carotid artery occlusion; Coronary artery disease; Diabetes mellitus ([facility] [address] ); Ectopic pregnancy; Hyperlipidemia; and Hypertension  PAST SURGICAL HISTORY:   has a past surgical history that includes Coronary angioplasty; Coronary angioplasty with stent;  section; Closed reduction metatarsal fracture (Left); Tubal ligation; and Eye surgery (Bilateral)  CURRENT MEDICATIONS:   Current Outpatient Prescriptions   Medication Sig Dispense Refill    apixaban (ELIQUIS) 5 mg Take 2 tablets (10 mg total) by mouth 2 (two) times a day 10 mg twice a day completed on , then 5 mg twice a day for the next 6 months 150 tablet 0    apixaban (ELIQUIS) 5 mg Take 1 tablet (5 mg total) by mouth 2 (two) times a day 180 tablet 3    aspirin 81 MG tablet Take 1 tablet by mouth daily      DIANE CONTOUR NEXT TEST test strip       clopidogrel (PLAVIX) 75 mg tablet TAKE 1 TABLET DAILY 90 tablet 0    FLUZONE QUADRIVALENT 0 5 ML TAMEKA TO BE ADMINISTERED BY PHARMACIST FOR IMMUNIZATION  0    Glucose Blood (IGLUCOSE TEST STRIPS VI) Test 5 times daily, e10 9, pump therapy      insulin lispro (HUMALOG) 100 units/mL injection Inject under the skin        Insulin Syringe-Needle U-100 (INSULIN SYRINGE 1CC/31GX5/16") 31G X 5/16" 1 ML MISC Inject 2 times per day      lisinopril (ZESTRIL) 10 mg tablet TAKE 1 TABLET DAILY 90 tablet 2    metoprolol tartrate (LOPRESSOR) 25 mg tablet TAKE 1 TABLET TWICE A  tablet 0    niacin (NIASPAN) 500 mg CR tablet Take 1 tablet by mouth daily      nitroglycerin (NITROLINGUAL) 0 4 mg/spray spray Nitroglycerin 400 MCG/SPRAY Translingual Aerosol Solution; PRN CHEST PAIN; 90; 3; 2016; 2016; Ernesto Gurrola;  Active  simvastatin (ZOCOR) 40 mg tablet TAKE 1 TABLET DAILY 90 tablet 2     No current facility-administered medications for this visit  [unfilled]    SOCIAL HISTORY:   reports that she has quit smoking  She has never used smokeless tobacco  She reports that she drinks alcohol  She reports that she does not use drugs  FAMILY HISTORY:  family history includes Arrhythmia in her father; Other in her family; Prostate cancer in her father  ALLERGIES:  is allergic to codeine and other  REVIEW OF SYSTEMS:  Please note that a 14-point review of systems was performed to include Constitutional, HEENT, Respiratory, CVS, GI, , Musculoskeletal, Integumentary, Neurologic, Rheumatologic, Endocrinologic, Psychiatric, Lymphatic, and Hematologic/Oncologic systems were reviewed and are negative unless otherwise stated in HPI  Positive and negative findings pertinent to this evaluation are incorporated into the history of present illness  LAB:  Lab Results   Component Value Date    WBC 7 85 10/23/2018    HGB 11 8 10/23/2018    HCT 35 4 10/23/2018    MCV 94 10/23/2018     10/23/2018     Lab Results   Component Value Date    K 4 4 10/24/2018     10/24/2018    CO2 23 10/24/2018    BUN 30 (H) 10/24/2018    CREATININE 1 28 10/24/2018    GLUF 178 (H) 04/24/2017    CALCIUM 8 6 10/24/2018    AST 17 10/22/2018    ALT 24 10/22/2018    ALKPHOS 139 (H) 10/22/2018    PROT 6 7 03/08/2014    BILITOT 0 4 03/08/2014    EGFR 45 10/24/2018       IMAGING:  No orders to display     Vas Carotid Complete Study    Result Date: 11/28/2018  Narrative:  [facility] REPORT CLINICAL: Indications:  Carotid disease w/o CVA [I65 29]  6 month surveillance of carotid artery disease  Patient is asymptomatic at this time  Operative History: 2003-01-01 Cardiac angioplasty with stent placement Risk Factors The patient has history of HTN, Diabetes (IDDM adult) and HLD    FINDINGS:  Segment      Rig                     Left PSV  EDV (cm/s)  Ratio  PSV  EDV (cm/s)  Ratio  Dist  ICA     97          14   1 36   84          22   1 14  Mid  ICA      89          19   1 24  135          25   1 83  Prox  ICA    226          59   3 17  122          23   1 66  Dist CCA      55          16          67          15         Mid CCA       71          17   1 08   74          19   1 17  Prox CCA      66           9          63          16         Ext Carotid  307          24   4 30  213          23   2 89  Prox Vert     43          11          65          13         Subclavian    65           0         129           0            CONCLUSION: Impression RIGHT: There is >70% stenosis noted in the internal carotid artery  Plaque is heterogenous and irregular  Vertebral artery flow is antegrade  There are elevated velocities visualized in the proximal external carotid artery  There is no significant subclavian artery disease  LEFT: There is 50-69% stenosis noted in the internal carotid artery  Plaque is heterogenous and irregular  Vertebral artery flow is antegrade  There is no significant subclavian artery disease  Compared to previous study on 4/12/2018, there is no significant change  Recommend repeat testing in 6month as per protocol unless otherwise indicated  Internal carotid artery stenosis determination by consensus criteria from: Flo Mccallum et al  Carotid Artery Stenosis: Gray-Scale and Doppler US Diagnosis - Society of Radiologists in [address] [place] [address], Radiology 2003; [phone]  SIGNATURE: Electronically Signed by: Nicolas Horton MD, RPVI on 2018-11-28 04:43:37 PM    Ct Abdomen Pelvis W Contrast    Result Date: 11/21/2018  Narrative: CT ABDOMEN AND PELVIS WITH IV CONTRAST INDICATION:   I26 99: Other pulmonary embolism without acute cor pulmonale I82 431: Acute embolism and thrombosis of right popliteal vein N17 9: Acute kidney failure, unspecified  COMPARISON:  None   TECHNIQUE:  CT examination of the abdomen and pelvis was performed  Axial, sagittal, and coronal 2D reformatted images were created from the source data and submitted for interpretation  Radiation dose length product (DLP) for this visit:  714 mGy-cm   This examination, like all CT scans performed in the [facility], was performed utilizing techniques to minimize radiation dose exposure, including the use of iterative reconstruction and automated exposure control  IV Contrast:  100 mL of iodixanol (VISIPAQUE) Enteric Contrast:  Enteric contrast was not administered  FINDINGS: ABDOMEN LOWER CHEST:  A right lower lobe lung nodule seen, measuring 10 mm  This is unchanged from the previous study of October 22, 2018 and June 16, 2016 LIVER/BILIARY TREE: No discrete liver lesions seen GALLBLADDER:  No calcified gallstones  No pericholecystic inflammatory change  SPLEEN:  Unremarkable  PANCREAS:  Unremarkable  ADRENAL GLANDS:  Unremarkable  KIDNEYS/URETERS:  Prominent right renal pelvis and right ureter without any obstructing calculus STOMACH AND BOWEL:  No abnormal dilation of small bowel loops seen Small hiatal hernia seen  Mild thickening of the terminal ileum seen with the submucosal hypodensity related of fat deposition Small focal area of colonic thickening seen in the ascending colon, seen in image 46 series 2 APPENDIX:  No findings to suggest appendicitis  ABDOMINOPELVIC CAVITY:  No ascites or free intraperitoneal air  No lymphadenopathy  VESSELS:  The celiac trunk, SMA, STACY are patent Iliac vessels are patent PELVIS REPRODUCTIVE ORGANS:  The uterus appears unremarkable There is no pelvic adnexal mass seen URINARY BLADDER:  Small focus of calcification seen in the anterior aspect of the urinary bladder without associated the focal wall thickening ABDOMINAL WALL/INGUINAL REGIONS:  Unremarkable   OSSEOUS STRUCTURES:  No acute compression collapse of the vertebra seen No gross lytic lesion seen Severe left foraminal narrowing and severe right foraminal narrowing seen at L5-S1     Impression: No large intra-abdominal mass seen A small focal area of  colonic thickening seen in image 46 series 2  This may be due to peristalsis or due to focal lesion  Correlation with recent colonoscopy suggested Thickening of the terminal ileum noted with submucosal hypodensity due to deposition of fat     This may be sequela of chronic inflammation or related to patient's body habitus Right lower lobe lung nodule, seen in image 4 series 2, stable since the previous study of June 16, 2016 for period of more than 2 years Mild dilation of the right renal pelvis and right Without any obstructing calculus  Small focus of calcification seen in the anterior aspect measuring about without any associated focal wall thickening, nonspecific The study was marked in EPIC for significant notification   Workstation performed: [id number]

## 2018-12-09 DIAGNOSIS — I25.10 CORONARY ARTERY DISEASE INVOLVING NATIVE CORONARY ARTERY OF NATIVE HEART WITHOUT ANGINA PECTORIS: Primary | ICD-10-CM

## 2018-12-10 RX ORDER — NIACIN 500 MG/1
TABLET, EXTENDED RELEASE ORAL
Qty: 90 TABLET | Refills: 3 | Status: SHIPPED | OUTPATIENT
Start: 2018-12-10 | End: 2019-12-07 | Stop reason: SDUPTHER

## 2019-02-05 ENCOUNTER — HOSPITAL ENCOUNTER (OUTPATIENT)
Dept: NON INVASIVE DIAGNOSTICS | Facility: CLINIC | Age: 64
Discharge: HOME | End: 2019-02-05
Payer: COMMERCIAL

## 2019-02-05 DIAGNOSIS — I25.10 CORONARY ARTERY DISEASE INVOLVING NATIVE CORONARY ARTERY OF NATIVE HEART WITHOUT ANGINA PECTORIS: ICD-10-CM

## 2019-02-05 PROCEDURE — 93306 TTE W/DOPPLER COMPLETE: CPT

## 2019-02-05 PROCEDURE — 93306 TTE W/DOPPLER COMPLETE: CPT | Performed by: INTERNAL MEDICINE

## 2019-02-26 DIAGNOSIS — I25.10 CORONARY ARTERY DISEASE INVOLVING NATIVE CORONARY ARTERY OF NATIVE HEART WITHOUT ANGINA PECTORIS: ICD-10-CM

## 2019-03-07 DIAGNOSIS — I25.10 CORONARY ARTERY DISEASE INVOLVING NATIVE CORONARY ARTERY OF NATIVE HEART WITHOUT ANGINA PECTORIS: ICD-10-CM

## 2019-03-29 ENCOUNTER — TELEPHONE (OUTPATIENT)
Dept: CARDIOLOGY CLINIC | Facility: CLINIC | Age: 64
End: 2019-03-29

## 2019-03-29 NOTE — TELEPHONE ENCOUNTER
P/c states having SOB with exertion, and elevated BP  Had an increase in Lisinopril from 10 to 20 mg QD  Today /80    Will come in to see Homa Leyva on Tuesday 4/2

## 2019-04-02 ENCOUNTER — OFFICE VISIT (OUTPATIENT)
Dept: CARDIOLOGY CLINIC | Facility: CLINIC | Age: 64
End: 2019-04-02
Payer: COMMERCIAL

## 2019-04-02 VITALS
HEIGHT: 65 IN | SYSTOLIC BLOOD PRESSURE: 172 MMHG | WEIGHT: 198.7 LBS | OXYGEN SATURATION: 98 % | HEART RATE: 55 BPM | BODY MASS INDEX: 33.11 KG/M2 | DIASTOLIC BLOOD PRESSURE: 52 MMHG

## 2019-04-02 DIAGNOSIS — I26.99 OTHER PULMONARY EMBOLISM WITHOUT ACUTE COR PULMONALE, UNSPECIFIED CHRONICITY (HCC): ICD-10-CM

## 2019-04-02 DIAGNOSIS — I10 ESSENTIAL HYPERTENSION: Primary | ICD-10-CM

## 2019-04-02 DIAGNOSIS — I25.10 CORONARY ARTERY DISEASE INVOLVING NATIVE CORONARY ARTERY OF NATIVE HEART WITHOUT ANGINA PECTORIS: ICD-10-CM

## 2019-04-02 DIAGNOSIS — E78.5 HYPERLIPIDEMIA, UNSPECIFIED HYPERLIPIDEMIA TYPE: ICD-10-CM

## 2019-04-02 DIAGNOSIS — E10.40 TYPE 1 DIABETES MELLITUS WITH SENSORY NEUROPATHY (HCC): ICD-10-CM

## 2019-04-02 PROCEDURE — 99215 OFFICE O/P EST HI 40 MIN: CPT | Performed by: NURSE PRACTITIONER

## 2019-04-02 RX ORDER — CHLORTHALIDONE 25 MG/1
25 TABLET ORAL DAILY
Qty: 30 TABLET | Refills: 2 | Status: SHIPPED | OUTPATIENT
Start: 2019-04-02 | End: 2019-04-09 | Stop reason: CLARIF

## 2019-04-03 DIAGNOSIS — I65.23 CAROTID STENOSIS, BILATERAL: Primary | ICD-10-CM

## 2019-04-08 ENCOUNTER — APPOINTMENT (OUTPATIENT)
Dept: LAB | Facility: HOSPITAL | Age: 64
End: 2019-04-08
Attending: INTERNAL MEDICINE
Payer: COMMERCIAL

## 2019-04-08 ENCOUNTER — TELEPHONE (OUTPATIENT)
Dept: FAMILY MEDICINE CLINIC | Facility: CLINIC | Age: 64
End: 2019-04-08

## 2019-04-08 DIAGNOSIS — E87.5 HYPERKALEMIA: Primary | ICD-10-CM

## 2019-04-08 DIAGNOSIS — I82.431 ACUTE DEEP VEIN THROMBOSIS (DVT) OF RIGHT POPLITEAL VEIN (HCC): ICD-10-CM

## 2019-04-08 LAB
ALBUMIN SERPL BCP-MCNC: 3.6 G/DL (ref 3.5–5)
ALP SERPL-CCNC: 129 U/L (ref 46–116)
ALT SERPL W P-5'-P-CCNC: 26 U/L (ref 12–78)
ANION GAP SERPL CALCULATED.3IONS-SCNC: 11 MMOL/L (ref 4–13)
AST SERPL W P-5'-P-CCNC: 8 U/L (ref 5–45)
BASOPHILS # BLD AUTO: 0.1 THOUSANDS/ΜL (ref 0–0.1)
BASOPHILS NFR BLD AUTO: 2 % (ref 0–1)
BILIRUB SERPL-MCNC: 0.2 MG/DL (ref 0.2–1)
BUN SERPL-MCNC: 39 MG/DL (ref 5–25)
CALCIUM SERPL-MCNC: 8.9 MG/DL (ref 8.3–10.1)
CHLORIDE SERPL-SCNC: 105 MMOL/L (ref 100–108)
CO2 SERPL-SCNC: 21 MMOL/L (ref 21–32)
CREAT SERPL-MCNC: 1.5 MG/DL (ref 0.6–1.3)
EOSINOPHIL # BLD AUTO: 0.18 THOUSAND/ΜL (ref 0–0.61)
EOSINOPHIL NFR BLD AUTO: 3 % (ref 0–6)
ERYTHROCYTE [DISTWIDTH] IN BLOOD BY AUTOMATED COUNT: 13.2 % (ref 11.6–15.1)
GFR SERPL CREATININE-BSD FRML MDRD: 37 ML/MIN/1.73SQ M
GLUCOSE SERPL-MCNC: 239 MG/DL (ref 65–140)
HCT VFR BLD AUTO: 37.2 % (ref 34.8–46.1)
HGB BLD-MCNC: 12.2 G/DL (ref 11.5–15.4)
IMM GRANULOCYTES # BLD AUTO: 0.02 THOUSAND/UL (ref 0–0.2)
IMM GRANULOCYTES NFR BLD AUTO: 0 % (ref 0–2)
LYMPHOCYTES # BLD AUTO: 1.56 THOUSANDS/ΜL (ref 0.6–4.47)
LYMPHOCYTES NFR BLD AUTO: 24 % (ref 14–44)
MCH RBC QN AUTO: 30.5 PG (ref 26.8–34.3)
MCHC RBC AUTO-ENTMCNC: 32.8 G/DL (ref 31.4–37.4)
MCV RBC AUTO: 93 FL (ref 82–98)
MONOCYTES # BLD AUTO: 0.6 THOUSAND/ΜL (ref 0.17–1.22)
MONOCYTES NFR BLD AUTO: 9 % (ref 4–12)
NEUTROPHILS # BLD AUTO: 4.01 THOUSANDS/ΜL (ref 1.85–7.62)
NEUTS SEG NFR BLD AUTO: 62 % (ref 43–75)
NRBC BLD AUTO-RTO: 0 /100 WBCS
PLATELET # BLD AUTO: 245 THOUSANDS/UL (ref 149–390)
PMV BLD AUTO: 11.7 FL (ref 8.9–12.7)
POTASSIUM SERPL-SCNC: 5.9 MMOL/L (ref 3.5–5.3)
PROT SERPL-MCNC: 7 G/DL (ref 6.4–8.2)
RBC # BLD AUTO: 4 MILLION/UL (ref 3.81–5.12)
SODIUM SERPL-SCNC: 137 MMOL/L (ref 136–145)
TSH SERPL DL<=0.05 MIU/L-ACNC: 2.98 UIU/ML (ref 0.36–3.74)
WBC # BLD AUTO: 6.47 THOUSAND/UL (ref 4.31–10.16)

## 2019-04-08 PROCEDURE — 81241 F5 GENE: CPT

## 2019-04-08 PROCEDURE — 80053 COMPREHEN METABOLIC PANEL: CPT | Performed by: PHYSICIAN ASSISTANT

## 2019-04-08 PROCEDURE — 85303 CLOT INHIBIT PROT C ACTIVITY: CPT

## 2019-04-08 PROCEDURE — 85306 CLOT INHIBIT PROT S FREE: CPT

## 2019-04-08 PROCEDURE — 86146 BETA-2 GLYCOPROTEIN ANTIBODY: CPT

## 2019-04-08 PROCEDURE — 85305 CLOT INHIBIT PROT S TOTAL: CPT

## 2019-04-08 PROCEDURE — 85613 RUSSELL VIPER VENOM DILUTED: CPT

## 2019-04-08 PROCEDURE — 86147 CARDIOLIPIN ANTIBODY EA IG: CPT

## 2019-04-08 PROCEDURE — 36415 COLL VENOUS BLD VENIPUNCTURE: CPT

## 2019-04-08 PROCEDURE — 85025 COMPLETE CBC W/AUTO DIFF WBC: CPT | Performed by: PHYSICIAN ASSISTANT

## 2019-04-08 PROCEDURE — 85300 ANTITHROMBIN III ACTIVITY: CPT

## 2019-04-08 PROCEDURE — 81240 F2 GENE: CPT

## 2019-04-08 PROCEDURE — 85705 THROMBOPLASTIN INHIBITION: CPT

## 2019-04-08 PROCEDURE — 84443 ASSAY THYROID STIM HORMONE: CPT | Performed by: PHYSICIAN ASSISTANT

## 2019-04-08 PROCEDURE — 85732 THROMBOPLASTIN TIME PARTIAL: CPT

## 2019-04-08 PROCEDURE — 85670 THROMBIN TIME PLASMA: CPT

## 2019-04-09 ENCOUNTER — APPOINTMENT (OUTPATIENT)
Dept: LAB | Facility: HOSPITAL | Age: 64
End: 2019-04-09
Payer: COMMERCIAL

## 2019-04-09 ENCOUNTER — TELEPHONE (OUTPATIENT)
Dept: CARDIOLOGY CLINIC | Facility: CLINIC | Age: 64
End: 2019-04-09

## 2019-04-09 DIAGNOSIS — I10 ESSENTIAL HYPERTENSION: ICD-10-CM

## 2019-04-09 DIAGNOSIS — I10 ESSENTIAL HYPERTENSION: Primary | ICD-10-CM

## 2019-04-09 DIAGNOSIS — E87.5 HYPERKALEMIA: ICD-10-CM

## 2019-04-09 LAB
ANION GAP SERPL CALCULATED.3IONS-SCNC: 11 MMOL/L (ref 4–13)
BUN SERPL-MCNC: 40 MG/DL (ref 5–25)
CALCIUM SERPL-MCNC: 9 MG/DL (ref 8.3–10.1)
CARDIOLIPIN IGA SER IA-ACNC: <9 APL U/ML (ref 0–11)
CARDIOLIPIN IGG SER IA-ACNC: <9 GPL U/ML (ref 0–14)
CARDIOLIPIN IGM SER IA-ACNC: <9 MPL U/ML (ref 0–12)
CHLORIDE SERPL-SCNC: 107 MMOL/L (ref 100–108)
CO2 SERPL-SCNC: 20 MMOL/L (ref 21–32)
CREAT SERPL-MCNC: 1.43 MG/DL (ref 0.6–1.3)
DEPRECATED AT III PPP: 92 % OF NORMAL (ref 92–136)
GFR SERPL CREATININE-BSD FRML MDRD: 40 ML/MIN/1.73SQ M
GLUCOSE SERPL-MCNC: 108 MG/DL (ref 65–140)
POTASSIUM SERPL-SCNC: 5.6 MMOL/L (ref 3.5–5.3)
SODIUM SERPL-SCNC: 138 MMOL/L (ref 136–145)

## 2019-04-09 PROCEDURE — 80048 BASIC METABOLIC PNL TOTAL CA: CPT

## 2019-04-09 PROCEDURE — 36415 COLL VENOUS BLD VENIPUNCTURE: CPT

## 2019-04-09 RX ORDER — CARVEDILOL 25 MG/1
25 TABLET ORAL 2 TIMES DAILY WITH MEALS
Qty: 60 TABLET | Refills: 3 | Status: SHIPPED | OUTPATIENT
Start: 2019-04-09 | End: 2019-05-28 | Stop reason: SDUPTHER

## 2019-04-10 LAB
APTT SCREEN TO CONFIRM RATIO: 1.06 RATIO (ref 0–1.4)
B2 GLYCOPROT1 IGA SER-ACNC: <9 GPI IGA UNITS (ref 0–25)
B2 GLYCOPROT1 IGG SER-ACNC: <9 GPI IGG UNITS (ref 0–20)
B2 GLYCOPROT1 IGM SER-ACNC: <9 GPI IGM UNITS (ref 0–32)
CONFIRM APTT/NORMAL: 40.2 SEC (ref 0–55)
LA PPP-IMP: NORMAL
SCREEN APTT: 34.6 SEC (ref 0–51.9)
SCREEN DRVVT: 33.6 SEC (ref 0–47)
THROMBIN TIME: 17.5 SEC (ref 0–23)

## 2019-04-11 LAB
F2 GENE MUT ANL BLD/T: NORMAL
PROT C AG ACT/NOR PPP IA: 106 % OF NORMAL (ref 60–150)
PROT S ACT/NOR PPP: 23 % (ref 63–140)
PROT S ACT/NOR PPP: <25 % (ref 57–157)
PROT S PPP-ACNC: 64 % (ref 60–150)

## 2019-04-12 LAB — F5 GENE MUT ANL BLD/T: NORMAL

## 2019-04-15 ENCOUNTER — OFFICE VISIT (OUTPATIENT)
Dept: HEMATOLOGY ONCOLOGY | Facility: CLINIC | Age: 64
End: 2019-04-15
Payer: COMMERCIAL

## 2019-04-15 VITALS
WEIGHT: 197 LBS | SYSTOLIC BLOOD PRESSURE: 106 MMHG | DIASTOLIC BLOOD PRESSURE: 58 MMHG | BODY MASS INDEX: 32.82 KG/M2 | HEIGHT: 65 IN | OXYGEN SATURATION: 98 % | TEMPERATURE: 98.2 F | HEART RATE: 72 BPM | RESPIRATION RATE: 18 BRPM

## 2019-04-15 DIAGNOSIS — Z82.49 FAMILY HISTORY OF THROMBOSIS: ICD-10-CM

## 2019-04-15 DIAGNOSIS — I82.431 ACUTE DEEP VEIN THROMBOSIS (DVT) OF RIGHT POPLITEAL VEIN (HCC): Primary | ICD-10-CM

## 2019-04-15 DIAGNOSIS — D68.59 PROTEIN S DEFICIENCY (HCC): ICD-10-CM

## 2019-04-15 PROCEDURE — 99215 OFFICE O/P EST HI 40 MIN: CPT | Performed by: INTERNAL MEDICINE

## 2019-04-17 ENCOUNTER — APPOINTMENT (OUTPATIENT)
Dept: LAB | Facility: HOSPITAL | Age: 64
End: 2019-04-17
Payer: COMMERCIAL

## 2019-04-17 DIAGNOSIS — I10 ESSENTIAL HYPERTENSION: ICD-10-CM

## 2019-04-17 LAB
ANION GAP SERPL CALCULATED.3IONS-SCNC: 9 MMOL/L (ref 4–13)
BUN SERPL-MCNC: 29 MG/DL (ref 5–25)
CALCIUM SERPL-MCNC: 8.8 MG/DL (ref 8.3–10.1)
CHLORIDE SERPL-SCNC: 105 MMOL/L (ref 100–108)
CO2 SERPL-SCNC: 26 MMOL/L (ref 21–32)
CREAT SERPL-MCNC: 1.16 MG/DL (ref 0.6–1.3)
GFR SERPL CREATININE-BSD FRML MDRD: 51 ML/MIN/1.73SQ M
GLUCOSE P FAST SERPL-MCNC: 169 MG/DL (ref 65–99)
POTASSIUM SERPL-SCNC: 4.9 MMOL/L (ref 3.5–5.3)
SODIUM SERPL-SCNC: 140 MMOL/L (ref 136–145)

## 2019-04-17 PROCEDURE — 36415 COLL VENOUS BLD VENIPUNCTURE: CPT

## 2019-04-17 PROCEDURE — 80048 BASIC METABOLIC PNL TOTAL CA: CPT

## 2019-04-22 ENCOUNTER — TELEPHONE (OUTPATIENT)
Dept: HEMATOLOGY ONCOLOGY | Facility: CLINIC | Age: 64
End: 2019-04-22

## 2019-04-23 ENCOUNTER — HOSPITAL ENCOUNTER (OUTPATIENT)
Dept: NON INVASIVE DIAGNOSTICS | Facility: CLINIC | Age: 64
Discharge: HOME | End: 2019-04-23
Payer: COMMERCIAL

## 2019-04-23 DIAGNOSIS — I10 ESSENTIAL HYPERTENSION: ICD-10-CM

## 2019-04-23 PROCEDURE — 93306 TTE W/DOPPLER COMPLETE: CPT

## 2019-04-23 PROCEDURE — 93306 TTE W/DOPPLER COMPLETE: CPT | Performed by: INTERNAL MEDICINE

## 2019-04-24 ENCOUNTER — HOSPITAL ENCOUNTER (OUTPATIENT)
Dept: ULTRASOUND IMAGING | Facility: HOSPITAL | Age: 64
Discharge: HOME | End: 2019-04-24
Attending: INTERNAL MEDICINE
Payer: COMMERCIAL

## 2019-04-24 ENCOUNTER — TELEPHONE (OUTPATIENT)
Dept: HEMATOLOGY ONCOLOGY | Facility: HOSPITAL | Age: 64
End: 2019-04-24

## 2019-04-24 DIAGNOSIS — I82.431 ACUTE DEEP VEIN THROMBOSIS (DVT) OF RIGHT POPLITEAL VEIN (HCC): ICD-10-CM

## 2019-04-24 PROCEDURE — 93971 EXTREMITY STUDY: CPT

## 2019-04-24 PROCEDURE — 93971 EXTREMITY STUDY: CPT | Performed by: SURGERY

## 2019-04-30 ENCOUNTER — OFFICE VISIT (OUTPATIENT)
Dept: CARDIOLOGY CLINIC | Facility: CLINIC | Age: 64
End: 2019-04-30
Payer: COMMERCIAL

## 2019-04-30 VITALS
HEART RATE: 65 BPM | WEIGHT: 198.6 LBS | DIASTOLIC BLOOD PRESSURE: 64 MMHG | SYSTOLIC BLOOD PRESSURE: 158 MMHG | HEIGHT: 65 IN | OXYGEN SATURATION: 98 % | BODY MASS INDEX: 33.09 KG/M2

## 2019-04-30 DIAGNOSIS — E78.5 HYPERLIPIDEMIA, UNSPECIFIED HYPERLIPIDEMIA TYPE: ICD-10-CM

## 2019-04-30 DIAGNOSIS — IMO0001 IDDM (INSULIN DEPENDENT DIABETES MELLITUS): ICD-10-CM

## 2019-04-30 DIAGNOSIS — D68.59 PROTEIN S DEFICIENCY (HCC): ICD-10-CM

## 2019-04-30 DIAGNOSIS — I26.99 OTHER PULMONARY EMBOLISM WITHOUT ACUTE COR PULMONALE, UNSPECIFIED CHRONICITY (HCC): ICD-10-CM

## 2019-04-30 DIAGNOSIS — I10 ESSENTIAL HYPERTENSION: Primary | ICD-10-CM

## 2019-04-30 PROCEDURE — 99214 OFFICE O/P EST MOD 30 MIN: CPT | Performed by: NURSE PRACTITIONER

## 2019-04-30 RX ORDER — CLOPIDOGREL BISULFATE 75 MG/1
75 TABLET ORAL DAILY
Qty: 90 TABLET | Refills: 3 | Status: SHIPPED | OUTPATIENT
Start: 2019-04-30 | End: 2020-05-12

## 2019-04-30 RX ORDER — HYDROCHLOROTHIAZIDE 12.5 MG/1
12.5 TABLET ORAL DAILY
Qty: 30 TABLET | Refills: 3 | Status: SHIPPED | OUTPATIENT
Start: 2019-04-30 | End: 2019-05-09

## 2019-05-01 ENCOUNTER — TELEPHONE (OUTPATIENT)
Dept: CARDIOLOGY CLINIC | Facility: CLINIC | Age: 64
End: 2019-05-01

## 2019-05-09 ENCOUNTER — TELEPHONE (OUTPATIENT)
Dept: CARDIOLOGY CLINIC | Facility: CLINIC | Age: 64
End: 2019-05-09

## 2019-05-09 DIAGNOSIS — I10 HYPERTENSION, UNSPECIFIED TYPE: Primary | ICD-10-CM

## 2019-05-09 RX ORDER — LISINOPRIL 20 MG/1
20 TABLET ORAL DAILY
Qty: 90 TABLET | Refills: 3 | Status: SHIPPED | OUTPATIENT
Start: 2019-05-09

## 2019-05-28 ENCOUNTER — OFFICE VISIT (OUTPATIENT)
Dept: CARDIOLOGY CLINIC | Facility: CLINIC | Age: 64
End: 2019-05-28
Payer: COMMERCIAL

## 2019-05-28 VITALS
BODY MASS INDEX: 32.32 KG/M2 | HEART RATE: 60 BPM | HEIGHT: 65 IN | WEIGHT: 194 LBS | SYSTOLIC BLOOD PRESSURE: 148 MMHG | DIASTOLIC BLOOD PRESSURE: 68 MMHG

## 2019-05-28 DIAGNOSIS — I10 ESSENTIAL HYPERTENSION: ICD-10-CM

## 2019-05-28 DIAGNOSIS — I50.31 ACUTE DIASTOLIC CONGESTIVE HEART FAILURE (HCC): ICD-10-CM

## 2019-05-28 DIAGNOSIS — E78.5 HYPERLIPIDEMIA, UNSPECIFIED HYPERLIPIDEMIA TYPE: ICD-10-CM

## 2019-05-28 DIAGNOSIS — I25.10 CORONARY ARTERY DISEASE INVOLVING NATIVE CORONARY ARTERY OF NATIVE HEART WITHOUT ANGINA PECTORIS: Primary | ICD-10-CM

## 2019-05-28 DIAGNOSIS — I65.23 ASYMPTOMATIC BILATERAL CAROTID ARTERY STENOSIS: ICD-10-CM

## 2019-05-28 PROCEDURE — 99214 OFFICE O/P EST MOD 30 MIN: CPT | Performed by: INTERNAL MEDICINE

## 2019-05-28 RX ORDER — CARVEDILOL 25 MG/1
25 TABLET ORAL 2 TIMES DAILY WITH MEALS
Qty: 90 TABLET | Refills: 3 | Status: SHIPPED | OUTPATIENT
Start: 2019-05-28 | End: 2019-05-31 | Stop reason: SDUPTHER

## 2019-05-31 ENCOUNTER — HOSPITAL ENCOUNTER (OUTPATIENT)
Dept: ULTRASOUND IMAGING | Facility: HOSPITAL | Age: 64
Discharge: HOME | End: 2019-05-31
Attending: SURGERY
Payer: COMMERCIAL

## 2019-05-31 DIAGNOSIS — I65.23 CAROTID STENOSIS, BILATERAL: ICD-10-CM

## 2019-05-31 DIAGNOSIS — I10 ESSENTIAL HYPERTENSION: ICD-10-CM

## 2019-05-31 PROCEDURE — 93880 EXTRACRANIAL BILAT STUDY: CPT | Performed by: SURGERY

## 2019-05-31 PROCEDURE — 93880 EXTRACRANIAL BILAT STUDY: CPT

## 2019-05-31 RX ORDER — CARVEDILOL 25 MG/1
25 TABLET ORAL 2 TIMES DAILY WITH MEALS
Qty: 180 TABLET | Refills: 3 | Status: SHIPPED | OUTPATIENT
Start: 2019-05-31

## 2019-06-04 ENCOUNTER — OFFICE VISIT (OUTPATIENT)
Dept: VASCULAR SURGERY | Facility: CLINIC | Age: 64
End: 2019-06-04
Payer: COMMERCIAL

## 2019-06-04 VITALS
HEART RATE: 64 BPM | DIASTOLIC BLOOD PRESSURE: 68 MMHG | SYSTOLIC BLOOD PRESSURE: 148 MMHG | TEMPERATURE: 98 F | RESPIRATION RATE: 16 BRPM | BODY MASS INDEX: 32.99 KG/M2 | HEIGHT: 65 IN | WEIGHT: 198 LBS

## 2019-06-04 DIAGNOSIS — D68.59 PROTEIN S DEFICIENCY (HCC): ICD-10-CM

## 2019-06-04 DIAGNOSIS — I65.23 ASYMPTOMATIC BILATERAL CAROTID ARTERY STENOSIS: Primary | ICD-10-CM

## 2019-06-04 DIAGNOSIS — I82.431 ACUTE DEEP VEIN THROMBOSIS (DVT) OF RIGHT POPLITEAL VEIN (HCC): ICD-10-CM

## 2019-06-04 DIAGNOSIS — E78.2 MIXED HYPERLIPIDEMIA: ICD-10-CM

## 2019-06-04 PROCEDURE — 99214 OFFICE O/P EST MOD 30 MIN: CPT | Performed by: NURSE PRACTITIONER

## 2019-08-15 DIAGNOSIS — I25.10 CORONARY ARTERY DISEASE INVOLVING NATIVE CORONARY ARTERY OF NATIVE HEART WITHOUT ANGINA PECTORIS: ICD-10-CM

## 2019-08-15 RX ORDER — SIMVASTATIN 40 MG
TABLET ORAL
Qty: 90 TABLET | Refills: 2 | Status: SHIPPED | OUTPATIENT
Start: 2019-08-15

## 2019-12-06 DIAGNOSIS — I65.23 CAROTID STENOSIS, BILATERAL: Primary | ICD-10-CM

## 2019-12-07 DIAGNOSIS — I25.10 CORONARY ARTERY DISEASE INVOLVING NATIVE CORONARY ARTERY OF NATIVE HEART WITHOUT ANGINA PECTORIS: ICD-10-CM

## 2019-12-09 RX ORDER — NIACIN 500 MG/1
TABLET, EXTENDED RELEASE ORAL
Qty: 90 TABLET | Refills: 4 | Status: SHIPPED | OUTPATIENT
Start: 2019-12-09 | End: 2021-03-01

## 2020-03-29 DIAGNOSIS — I26.99 OTHER PULMONARY EMBOLISM WITHOUT ACUTE COR PULMONALE, UNSPECIFIED CHRONICITY (HCC): ICD-10-CM

## 2020-03-29 RX ORDER — APIXABAN 2.5 MG/1
TABLET, FILM COATED ORAL
Qty: 180 TABLET | Refills: 3 | Status: SHIPPED | OUTPATIENT
Start: 2020-03-29 | End: 2021-03-24

## 2020-05-09 DIAGNOSIS — E78.5 HYPERLIPIDEMIA, UNSPECIFIED HYPERLIPIDEMIA TYPE: ICD-10-CM

## 2020-05-12 RX ORDER — CLOPIDOGREL BISULFATE 75 MG/1
TABLET ORAL
Qty: 90 TABLET | Refills: 3 | Status: SHIPPED | OUTPATIENT
Start: 2020-05-12 | End: 2021-05-04

## 2021-03-01 DIAGNOSIS — I25.10 CORONARY ARTERY DISEASE INVOLVING NATIVE CORONARY ARTERY OF NATIVE HEART WITHOUT ANGINA PECTORIS: ICD-10-CM

## 2021-03-01 RX ORDER — NIACIN 500 MG/1
TABLET, EXTENDED RELEASE ORAL
Qty: 90 TABLET | Refills: 3 | Status: SHIPPED | OUTPATIENT
Start: 2021-03-01 | End: 2022-03-04

## 2021-03-24 DIAGNOSIS — I26.99 OTHER PULMONARY EMBOLISM WITHOUT ACUTE COR PULMONALE, UNSPECIFIED CHRONICITY (HCC): ICD-10-CM

## 2021-03-24 RX ORDER — APIXABAN 2.5 MG/1
TABLET, FILM COATED ORAL
Qty: 180 TABLET | Refills: 3 | Status: SHIPPED | OUTPATIENT
Start: 2021-03-24

## 2021-05-04 DIAGNOSIS — E78.5 HYPERLIPIDEMIA, UNSPECIFIED HYPERLIPIDEMIA TYPE: ICD-10-CM

## 2021-05-04 RX ORDER — CLOPIDOGREL BISULFATE 75 MG/1
TABLET ORAL
Qty: 90 TABLET | Refills: 3 | Status: SHIPPED | OUTPATIENT
Start: 2021-05-04

## 2022-03-04 DIAGNOSIS — I25.10 CORONARY ARTERY DISEASE INVOLVING NATIVE CORONARY ARTERY OF NATIVE HEART WITHOUT ANGINA PECTORIS: ICD-10-CM

## 2022-03-04 RX ORDER — NIACIN 500 MG/1
TABLET, EXTENDED RELEASE ORAL
Qty: 90 TABLET | Refills: 3 | Status: SHIPPED | OUTPATIENT
Start: 2022-03-04